# Patient Record
Sex: FEMALE | Race: OTHER | NOT HISPANIC OR LATINO | URBAN - METROPOLITAN AREA
[De-identification: names, ages, dates, MRNs, and addresses within clinical notes are randomized per-mention and may not be internally consistent; named-entity substitution may affect disease eponyms.]

---

## 2017-09-06 ENCOUNTER — OUTPATIENT (OUTPATIENT)
Dept: OUTPATIENT SERVICES | Facility: HOSPITAL | Age: 42
LOS: 1 days | End: 2017-09-06
Payer: COMMERCIAL

## 2017-09-06 DIAGNOSIS — Z00.00 ENCOUNTER FOR GENERAL ADULT MEDICAL EXAMINATION WITHOUT ABNORMAL FINDINGS: ICD-10-CM

## 2017-09-06 PROCEDURE — 71045 X-RAY EXAM CHEST 1 VIEW: CPT

## 2017-09-06 PROCEDURE — 71010: CPT | Mod: 26

## 2017-10-09 ENCOUNTER — RX RENEWAL (OUTPATIENT)
Age: 42
End: 2017-10-09

## 2017-10-09 PROBLEM — Z00.00 ENCOUNTER FOR PREVENTIVE HEALTH EXAMINATION: Status: ACTIVE | Noted: 2017-10-09

## 2017-10-10 ENCOUNTER — APPOINTMENT (OUTPATIENT)
Dept: NEUROLOGY | Facility: CLINIC | Age: 42
End: 2017-10-10

## 2017-12-21 ENCOUNTER — APPOINTMENT (OUTPATIENT)
Dept: ORTHOPEDIC SURGERY | Facility: CLINIC | Age: 42
End: 2017-12-21
Payer: COMMERCIAL

## 2017-12-21 VITALS — HEIGHT: 62 IN | BODY MASS INDEX: 27.05 KG/M2 | WEIGHT: 147 LBS

## 2017-12-21 VITALS — HEART RATE: 88 BPM | DIASTOLIC BLOOD PRESSURE: 71 MMHG | SYSTOLIC BLOOD PRESSURE: 108 MMHG

## 2017-12-21 DIAGNOSIS — M25.511 PAIN IN RIGHT SHOULDER: ICD-10-CM

## 2017-12-21 PROCEDURE — 99203 OFFICE O/P NEW LOW 30 MIN: CPT

## 2017-12-21 PROCEDURE — 73030 X-RAY EXAM OF SHOULDER: CPT | Mod: RT

## 2017-12-22 ENCOUNTER — APPOINTMENT (OUTPATIENT)
Dept: ENDOCRINOLOGY | Facility: CLINIC | Age: 42
End: 2017-12-22
Payer: COMMERCIAL

## 2017-12-22 VITALS
HEART RATE: 90 BPM | DIASTOLIC BLOOD PRESSURE: 70 MMHG | HEIGHT: 62 IN | OXYGEN SATURATION: 98 % | SYSTOLIC BLOOD PRESSURE: 120 MMHG | BODY MASS INDEX: 27.6 KG/M2 | WEIGHT: 150 LBS

## 2017-12-22 LAB
GLUCOSE BLDC GLUCOMTR-MCNC: 256
HBA1C MFR BLD HPLC: 8.6

## 2017-12-22 PROCEDURE — 99244 OFF/OP CNSLTJ NEW/EST MOD 40: CPT | Mod: 25

## 2017-12-22 PROCEDURE — 83036 HEMOGLOBIN GLYCOSYLATED A1C: CPT | Mod: QW

## 2017-12-22 PROCEDURE — 82962 GLUCOSE BLOOD TEST: CPT

## 2017-12-22 RX ORDER — URINE ACETONE TEST STRIPS
STRIP MISCELLANEOUS
Qty: 1 | Refills: 1 | Status: ACTIVE | COMMUNITY
Start: 2017-12-22 | End: 1900-01-01

## 2018-01-12 ENCOUNTER — APPOINTMENT (OUTPATIENT)
Dept: ENDOCRINOLOGY | Facility: CLINIC | Age: 43
End: 2018-01-12
Payer: COMMERCIAL

## 2018-01-12 PROCEDURE — G0108 DIAB MANAGE TRN  PER INDIV: CPT

## 2018-05-01 LAB
ALBUMIN SERPL ELPH-MCNC: 4 G/DL
ALP BLD-CCNC: 73 U/L
ALT SERPL-CCNC: 17 U/L
ANION GAP SERPL CALC-SCNC: 14 MMOL/L
AST SERPL-CCNC: 18 U/L
BILIRUB SERPL-MCNC: 0.2 MG/DL
BUN SERPL-MCNC: 13 MG/DL
CALCIUM SERPL-MCNC: 9.5 MG/DL
CHLORIDE SERPL-SCNC: 101 MMOL/L
CHOLEST SERPL-MCNC: 171 MG/DL
CHOLEST/HDLC SERPL: 2.9 RATIO
CO2 SERPL-SCNC: 23 MMOL/L
CREAT SERPL-MCNC: 0.89 MG/DL
CREAT SPEC-SCNC: 31 MG/DL
GLIADIN IGA SER QL: 6.2 UNITS
GLIADIN IGG SER QL: <5 UNITS
GLIADIN PEPTIDE IGA SER-ACNC: NEGATIVE
GLIADIN PEPTIDE IGG SER-ACNC: NEGATIVE
GLUCOSE SERPL-MCNC: 202 MG/DL
HDLC SERPL-MCNC: 58 MG/DL
LDLC SERPL CALC-MCNC: 71 MG/DL
MICROALBUMIN 24H UR DL<=1MG/L-MCNC: 0.3 MG/DL
MICROALBUMIN/CREAT 24H UR-RTO: 10 MG/G
POTASSIUM SERPL-SCNC: 4.2 MMOL/L
PROT SERPL-MCNC: 6.9 G/DL
SODIUM SERPL-SCNC: 138 MMOL/L
TRIGL SERPL-MCNC: 209 MG/DL
TSH SERPL-ACNC: 1.51 UIU/ML
TTG IGA SER IA-ACNC: 6.4 UNITS
TTG IGA SER-ACNC: NEGATIVE
TTG IGG SER IA-ACNC: <5 UNITS
TTG IGG SER IA-ACNC: NEGATIVE

## 2018-06-12 ENCOUNTER — APPOINTMENT (OUTPATIENT)
Dept: ENDOCRINOLOGY | Facility: CLINIC | Age: 43
End: 2018-06-12
Payer: COMMERCIAL

## 2018-06-12 VITALS
BODY MASS INDEX: 27.05 KG/M2 | OXYGEN SATURATION: 99 % | HEIGHT: 62 IN | SYSTOLIC BLOOD PRESSURE: 110 MMHG | HEART RATE: 8 BPM | WEIGHT: 147 LBS | DIASTOLIC BLOOD PRESSURE: 80 MMHG

## 2018-06-12 LAB
GLUCOSE BLDC GLUCOMTR-MCNC: 200
HBA1C MFR BLD HPLC: 8.1

## 2018-06-12 PROCEDURE — 99214 OFFICE O/P EST MOD 30 MIN: CPT

## 2018-06-12 RX ORDER — ROSUVASTATIN CALCIUM 5 MG/1
5 TABLET, FILM COATED ORAL
Qty: 30 | Refills: 5 | Status: COMPLETED | COMMUNITY
End: 2018-06-12

## 2018-06-12 RX ORDER — BLOOD SUGAR DIAGNOSTIC
STRIP MISCELLANEOUS
Qty: 3 | Refills: 11 | Status: ACTIVE | COMMUNITY
Start: 2018-06-12 | End: 1900-01-01

## 2018-10-05 LAB
CHOLEST SERPL-MCNC: 219 MG/DL
CHOLEST/HDLC SERPL: 3.9 RATIO
HBA1C MFR BLD HPLC: 8.4 %
HDLC SERPL-MCNC: 56 MG/DL
LDLC SERPL CALC-MCNC: 113 MG/DL
TRIGL SERPL-MCNC: 249 MG/DL

## 2018-10-09 ENCOUNTER — APPOINTMENT (OUTPATIENT)
Dept: ENDOCRINOLOGY | Facility: CLINIC | Age: 43
End: 2018-10-09
Payer: COMMERCIAL

## 2018-10-09 VITALS
DIASTOLIC BLOOD PRESSURE: 80 MMHG | BODY MASS INDEX: 26.68 KG/M2 | SYSTOLIC BLOOD PRESSURE: 112 MMHG | OXYGEN SATURATION: 98 % | WEIGHT: 145 LBS | HEART RATE: 72 BPM | HEIGHT: 62 IN

## 2018-10-09 PROCEDURE — 99214 OFFICE O/P EST MOD 30 MIN: CPT

## 2018-10-19 ENCOUNTER — APPOINTMENT (OUTPATIENT)
Dept: ORTHOPEDIC SURGERY | Facility: CLINIC | Age: 43
End: 2018-10-19
Payer: COMMERCIAL

## 2018-10-19 VITALS
SYSTOLIC BLOOD PRESSURE: 107 MMHG | HEIGHT: 62 IN | WEIGHT: 142 LBS | DIASTOLIC BLOOD PRESSURE: 74 MMHG | HEART RATE: 87 BPM | BODY MASS INDEX: 26.13 KG/M2

## 2018-10-19 DIAGNOSIS — M75.01 ADHESIVE CAPSULITIS OF RIGHT SHOULDER: ICD-10-CM

## 2018-10-19 DIAGNOSIS — M67.911 UNSPECIFIED DISORDER OF SYNOVIUM AND TENDON, RIGHT SHOULDER: ICD-10-CM

## 2018-10-19 DIAGNOSIS — M67.912 UNSPECIFIED DISORDER OF SYNOVIUM AND TENDON, LEFT SHOULDER: ICD-10-CM

## 2018-10-19 PROCEDURE — 99213 OFFICE O/P EST LOW 20 MIN: CPT

## 2019-02-01 ENCOUNTER — RESULT CHARGE (OUTPATIENT)
Age: 44
End: 2019-02-01

## 2019-02-01 ENCOUNTER — APPOINTMENT (OUTPATIENT)
Dept: ENDOCRINOLOGY | Facility: CLINIC | Age: 44
End: 2019-02-01

## 2019-03-11 ENCOUNTER — CHART COPY (OUTPATIENT)
Age: 44
End: 2019-03-11

## 2019-03-20 ENCOUNTER — RESULT REVIEW (OUTPATIENT)
Age: 44
End: 2019-03-20

## 2019-03-27 LAB
ALBUMIN SERPL ELPH-MCNC: 4.6 G/DL
ALP BLD-CCNC: 77 U/L
ALT SERPL-CCNC: 15 U/L
ANION GAP SERPL CALC-SCNC: 12 MMOL/L
AST SERPL-CCNC: 19 U/L
BILIRUB SERPL-MCNC: <0.2 MG/DL
BUN SERPL-MCNC: 12 MG/DL
CALCIUM SERPL-MCNC: 9.8 MG/DL
CHLORIDE SERPL-SCNC: 104 MMOL/L
CHOLEST SERPL-MCNC: 240 MG/DL
CHOLEST/HDLC SERPL: 3.8 RATIO
CO2 SERPL-SCNC: 25 MMOL/L
CREAT SERPL-MCNC: 0.84 MG/DL
CREAT SPEC-SCNC: 45 MG/DL
GLUCOSE SERPL-MCNC: 165 MG/DL
HBA1C MFR BLD HPLC: 8.6 %
HDLC SERPL-MCNC: 63 MG/DL
LDLC SERPL CALC-MCNC: 144 MG/DL
MICROALBUMIN 24H UR DL<=1MG/L-MCNC: <1.2 MG/DL
MICROALBUMIN/CREAT 24H UR-RTO: NORMAL MG/G
POTASSIUM SERPL-SCNC: 4.3 MMOL/L
PROT SERPL-MCNC: 7.3 G/DL
SODIUM SERPL-SCNC: 141 MMOL/L
TRIGL SERPL-MCNC: 164 MG/DL
TSH SERPL-ACNC: 1.89 UIU/ML

## 2019-03-29 ENCOUNTER — APPOINTMENT (OUTPATIENT)
Dept: ENDOCRINOLOGY | Facility: CLINIC | Age: 44
End: 2019-03-29
Payer: COMMERCIAL

## 2019-03-29 VITALS
WEIGHT: 147 LBS | SYSTOLIC BLOOD PRESSURE: 110 MMHG | BODY MASS INDEX: 27.05 KG/M2 | DIASTOLIC BLOOD PRESSURE: 80 MMHG | HEIGHT: 62 IN

## 2019-03-29 PROCEDURE — 95251 CONT GLUC MNTR ANALYSIS I&R: CPT

## 2019-03-29 PROCEDURE — 99214 OFFICE O/P EST MOD 30 MIN: CPT

## 2019-03-29 PROCEDURE — 95250 CONT GLUC MNTR PHYS/QHP EQP: CPT

## 2019-03-29 NOTE — ASSESSMENT
[FreeTextEntry1] : 42 year old female with type 1 DM, HLD, here for follow up endocrine visit. \par \par 1)Type 1 DM - moderately uncontrolled, with A1C 8.6% March 2018.  Certainly need more SMBG data to make major changes on insulin pump setting.  For now, will increase afternoon basal from 0.875-0.900 as below\par Start Time: 0:00 Basal: 1 units/hour \par Start Time: 3:00 Basal: 1.15 units/hour \par Start Time: 15:00 Basal: 0.875 --> 0.900 units/hour\par Start Time: 18:00 Basal: 1.00 units/hour       \par Start time: 19:00 Basal: 1.100 units/hour\par Breakfast: 1 unit per 7 grams of carbohydrate. \par Lunch: 1 unit per 4.5 grams of carbohydrate. \par Dinner: 1 unit per 4.5 grams of carbohydrate. \par Correction Insulin: (Blood Glucose Minus Target) divided by sensitivity factor \par BG Target = 100-120\par Insulin Sensitivity Factor = 30 \par Insulin on Board (IOB) Duration = 4 hours \par She will schedule CDE appointment, her insulin pump is out of warranty and she will qualify for a new pump. \par \par \par 2)Hyperlipidemia - LDL was not at goal, she has not been taking crestor conssistently c/w with Crestor 5mg daily\par Will repeat fasting lipid panel in 3 months [Carbohydrate Consistent Diet] : carbohydrate consistent diet [Hypoglycemia Management] : hypoglycemia management [Glucagon Use] : glucagon use [Ketone Testing] : ketone testing [Sick-Day Management] : sick-day management

## 2019-03-29 NOTE — HISTORY OF PRESENT ILLNESS
[FreeTextEntry1] : Ms. KELSEY VASQUEZ is a 43 year old female with history of type 1 DM, diagnosed at the age of 27. \par She has no prior history of diabetic retinopathy, neuropathy or nephropathy.  EGFR 85 ml/min/1.73\par She has no prior history of CAD, CHF or CVA. \par Previous endo at [facility], Dr Charlie Luna MD \par She transferred care here due to change of job.  \par Current diabetes symptoms/problems include: She reports no polydipsia no polyuria and no blurry vision.\par Patient reports adherence to prescribed medical regimen. Patient reports adherence to prescribed physical activity and dietary recommendations.  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 \par The following is a sample of patient’s daily diet/routine, had been more busy with work; transferred here from [facility].  \par Breakfast:boiled egg with coffee, crackers or piece of toast\par 9am - another half cup of coffee\par Lunch: sometimes sandwich, soup\par Dinner: meat, vegetable, and one Carb.  \par She had been just using manual bolus \par Current exercise: walks a lot, lift weights at home \par Weight trend: she gained some weight\par She eats the same thing every day. \par States that recently had been very stressed.  Her daughter is recently pregnant. (Due around Dec 2018) but states she's having some issues with her pregnancy so patient's glucose has been fluctuating secondary to stress.  \par She currently utilize a Minimed Paradigm Revel system.  Uses Humalog.  \par \par Last visit to ophthalmology was: July 2017, no retinopathy.  Needs follow up appointment. \par Last visit to podiatry was: No podiatry visit but no active issue with feet.  \par \par The patient is adherent to diabetic foot precautions:Yes\par Last A1c was: 12/22/2017 8.6% --> 10/09/2018 8.4% --> 03/29/2019 8.6%\par Last LDL was:  mg/dl She was on crestor 5mg daily but has not been taking them consistently.  Last LDL in March 2019 was 144 mg/dl.  \par \par Last urine micro-albumin was:negative March 2019\par \par She check glucose 2-3 times daily. She does not use a CGM. \par \par She has  ketone strips, she has glucagon kit \par \par Regarding hyperlipidemia, on Crestor 5mg daily but not taking consistently.

## 2019-04-12 ENCOUNTER — CLINICAL ADVICE (OUTPATIENT)
Age: 44
End: 2019-04-12

## 2019-04-18 ENCOUNTER — APPOINTMENT (OUTPATIENT)
Dept: ENDOCRINOLOGY | Facility: CLINIC | Age: 44
End: 2019-04-18

## 2019-05-16 ENCOUNTER — MEDICATION RENEWAL (OUTPATIENT)
Age: 44
End: 2019-05-16

## 2019-05-16 RX ORDER — SYRING-NEEDL,DISP,INSUL,0.3 ML 31 GX5/16"
31G X 5/16" SYRINGE, EMPTY DISPOSABLE MISCELLANEOUS
Qty: 1 | Refills: 0 | Status: ACTIVE | COMMUNITY
Start: 2019-05-16

## 2019-06-10 ENCOUNTER — CLINICAL ADVICE (OUTPATIENT)
Age: 44
End: 2019-06-10

## 2019-06-13 ENCOUNTER — APPOINTMENT (OUTPATIENT)
Dept: ENDOCRINOLOGY | Facility: CLINIC | Age: 44
End: 2019-06-13

## 2019-06-20 ENCOUNTER — APPOINTMENT (OUTPATIENT)
Dept: ENDOCRINOLOGY | Facility: CLINIC | Age: 44
End: 2019-06-20
Payer: COMMERCIAL

## 2019-06-20 PROCEDURE — P0006: CPT

## 2019-07-22 ENCOUNTER — APPOINTMENT (OUTPATIENT)
Dept: ENDOCRINOLOGY | Facility: CLINIC | Age: 44
End: 2019-07-22

## 2019-07-30 ENCOUNTER — RX RENEWAL (OUTPATIENT)
Age: 44
End: 2019-07-30

## 2019-10-29 ENCOUNTER — APPOINTMENT (OUTPATIENT)
Dept: ENDOCRINOLOGY | Facility: CLINIC | Age: 44
End: 2019-10-29
Payer: COMMERCIAL

## 2019-10-29 VITALS
DIASTOLIC BLOOD PRESSURE: 80 MMHG | HEART RATE: 80 BPM | SYSTOLIC BLOOD PRESSURE: 112 MMHG | BODY MASS INDEX: 25.76 KG/M2 | HEIGHT: 62 IN | WEIGHT: 140 LBS | OXYGEN SATURATION: 98 %

## 2019-10-29 LAB — GLUCOSE BLDC GLUCOMTR-MCNC: 93

## 2019-10-29 PROCEDURE — 99214 OFFICE O/P EST MOD 30 MIN: CPT

## 2019-10-29 NOTE — ASSESSMENT
[FreeTextEntry1] : Patient is a 44-year-old female with history of type 1 diabetes mellitus, here for endocrinology followup, currently utilizing DEXCOM G6 sensor as well as Tandem insulin pump.\par \par #1 type 1 diabetes mellitus\par A1c today is 6.9%.\par Patient notices hypoglycemia in the setting of corrections which suggests that her insulin sensitivity factor should be changed.  We will change insulin sensitivity factor from 1-30 to 1-60\par We will change the glucose doctor from 100 to 120 mg/dL.\par We will continue with the basal insulin as above.\par \par #2 Hyperlipidemia\par LDL not at goal of less than 70 mg/dL.  She has not been taking Crestor consistently.  Reporting some shoulder pain which I do not think it secondary to myalgias.  She will be starting physical therapy.  Recommend to restart Crestor 5 mg every other day.  We will repeat her blood work in 3 months.

## 2019-10-29 NOTE — THERAPY
[_____] :  [unfilled] units/hour [BG Target = ____] : BG Target = [unfilled] [Insulin Sensitivity Factor = ____] : Insulin Sensitivity Factor = [unfilled] [Insulin on Board (IOB) Duration = ____ hours] : Insulin on Board (IOB) Duration  = [unfilled] hours

## 2019-10-29 NOTE — HISTORY OF PRESENT ILLNESS
[FreeTextEntry1] : Angy is a 44-year-old female with history type 1 diabetes mellitus, diagnosed at age 27.  She had no prior microvascular complication including diabetic retinopathy, neuropathy nephropathy.  Her EGFR was 85 checked in March 2019.  She had no prior history of coronary artery disease, CHF with CVA as well.\par \par She is to follow with endocrinologist Dr. Luna at [facility] and now transferred care here when she started working for [employer].  \par \par She is currently using tandem insulin pump along with dexcom g6 sensor.  She really likes the Smart [place]  system which helped to prevent hypoglycemia.. \par \par She is still living in the [place] a long commute from home to work.  Looking into moving to [place].\par \par \par 9am - another half cup of coffee\par Lunch: sometimes sandwich, soup\par Dinner: meat, vegetable, and one Carb.  \par She had been just using manual bolus \par Current exercise: walks a lot, lift weights at home \par Weight trend: she gained some weight\par She eats the same thing every day. \par States that recently had been very stressed.  Her daughter is recently pregnant. (Due around Dec 2018) but states she's having some issues with her pregnancy so patient's glucose has been fluctuating secondary to stress.  \par She currently utilize a Minimed Paradigm Revel system.  Uses Humalog.  \par \par Last visit to ophthalmology was: July 2017, no retinopathy.  Needs follow up appointment. \par Last visit to podiatry was: No podiatry visit but no active issue with feet.  \par \par The patient is adherent to diabetic foot precautions:Yes\par Last A1c was: 12/22/2017 8.6% --> 10/09/2018 8.4% --> 03/29/2019 8.6%\par Last LDL was:  mg/dl She was on crestor 5mg daily but has not been taking them consistently.  Last LDL in March 2019 was 144 mg/dl.  \par \par Last urine micro-albumin was:negative March 2019\par \par She check glucose 2-3 times daily. She does not use a CGM. \par \par She has  ketone strips, she has glucagon kit \par \par Regarding hyperlipidemia, on Crestor 5mg daily but not taking consistently.

## 2019-10-31 LAB — HBA1C MFR BLD HPLC: 6.1

## 2019-11-11 ENCOUNTER — APPOINTMENT (OUTPATIENT)
Dept: ORTHOPEDIC SURGERY | Facility: CLINIC | Age: 44
End: 2019-11-11
Payer: COMMERCIAL

## 2019-11-11 VITALS
DIASTOLIC BLOOD PRESSURE: 75 MMHG | HEART RATE: 81 BPM | SYSTOLIC BLOOD PRESSURE: 122 MMHG | BODY MASS INDEX: 25.03 KG/M2 | HEIGHT: 62 IN | WEIGHT: 136 LBS

## 2019-11-11 PROCEDURE — 73030 X-RAY EXAM OF SHOULDER: CPT | Mod: LT

## 2019-11-11 PROCEDURE — 99214 OFFICE O/P EST MOD 30 MIN: CPT

## 2020-01-02 NOTE — PHYSICAL EXAM
[de-identified] : General: well-appearing, not in acute distress and not obese. \par Gait: normal. \par Oriented to time, place, person\par Mood: Normal\par Affect: Normal\par \par Left shoulder exam\par \par Inspection: No malalignment, No defects, No atrophy\par Skin: No masses, No lesions\par Neck: Negative Spurling's, full ROM, no pain with ROM\par AROM: FF to 160, abduction to 90, ER to 0, IR to mid lumbar\par Painful arc ROM: none\par Tenderness: + bicipital tenderness, no tenderness to the greater tuberosity/RTC insertion, + anterior shoulder/lesser tuberosity tenderness\par Strength: 5/5 ER, 4/5 IR in adduction with pain, 5/5 supraspinatus testing, + [place]'s test\par AC Joint: No ttp/pain with cross arm testing\par Biceps: Speed Negative, [place] Negative\par Impingement test: + Contreras, + Neer \par Stability: Stable\par Vasc: 2+ radial pulse\par Neuro: AIN, PIN, Ulnar nerve intact to motor\par Sensation: Intact to light touch throughout [de-identified] : The following radiographs were ordered and read by me during this patients visit. I reviewed each radiograph in detail with the patient and discussed the findings as highlighted below. \par \par 3 views of the left shoulder were obtained today 11/11/2019  that show no acute fracture or dislocation. There is no glenohumeral and no AC joint degenerative change seen. Type I acromion. There is no significant malalignment. No significant other obvious osseous abnormality, otherwise unremarkable\par

## 2020-01-02 NOTE — HISTORY OF PRESENT ILLNESS
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 No injury reported. The pain is constant worse at night. Localizes pain to her anterior aspect of the shoulder with radiation to her neck and left bicep. Denies numbness or tingling. She uses Aleve/Motrin occasionally which helps. Previously seen by Rosie who diagnosed her with adhesive capsulitis on the right shoulder in 2018. \par \par The patient's past medical history, past surgical history, medications and allergies were reviewed by me today with the patient and documented accordingly. In addition, the patient's family and social history, which were noncontributory to this visit, were reviewed also.

## 2020-01-02 NOTE — DISCUSSION/SUMMARY
[de-identified] : 44 year old female presents with left shoulder pain.\par \par A discussion was had with the patient regarding potential sources of inflammatory shoulder discomfort; including rotator cuff tendon dysfunction (including tendonitis vs. internal structural damage), subdeltoid/subacromial bursitis, or impingement of the rotator cuff at the acromion. Other contributing sources of pain may be the acromioclavicular joint or long head of the biceps tendon. Irritation is typically caused either by overhead repetitive activity or as a result of minor injury.\par  \par Discussed short-term and long-term outcomes as well as the goal of treatment to reduce pain and restore function. Nonsurgical treatment is typically first-line therapy that may take weeks to months to resolve symptoms; includes rest from overhead activities, NSAIDs, home exercise program versus physical therapy to restore normal strength/ROM/function of the shoulder, and possible corticosteroid injection. Also discussed the role of arthroscopic surgical intervention when nonsurgical treatment does not adequately relieve pain/inflammation.\par  \par Recommendations: Conservative modalities as above (overhead activity rest/activity avoidance until less symptomatic, ice, NSAIDs, home exercise strengthening and stretching program).\par \par Rx for Naproxen given. HEP given.\par  \par Followup: If symptoms progress or persist for additional treatment as needed

## 2020-02-06 LAB
ALBUMIN SERPL ELPH-MCNC: 4.3 G/DL
ALP BLD-CCNC: 57 U/L
ALT SERPL-CCNC: 14 U/L
ANION GAP SERPL CALC-SCNC: 13 MMOL/L
AST SERPL-CCNC: 19 U/L
BILIRUB SERPL-MCNC: 0.3 MG/DL
BUN SERPL-MCNC: 12 MG/DL
CALCIUM SERPL-MCNC: 9.6 MG/DL
CHLORIDE SERPL-SCNC: 101 MMOL/L
CHOLEST SERPL-MCNC: 207 MG/DL
CHOLEST/HDLC SERPL: 3.2 RATIO
CO2 SERPL-SCNC: 25 MMOL/L
CREAT SERPL-MCNC: 0.93 MG/DL
CREAT SPEC-SCNC: 46 MG/DL
ESTIMATED AVERAGE GLUCOSE: 151 MG/DL
GLUCOSE SERPL-MCNC: 219 MG/DL
HBA1C MFR BLD HPLC: 6.9 %
HDLC SERPL-MCNC: 64 MG/DL
LDLC SERPL CALC-MCNC: 109 MG/DL
MICROALBUMIN 24H UR DL<=1MG/L-MCNC: <1.2 MG/DL
MICROALBUMIN/CREAT 24H UR-RTO: NORMAL MG/G
POTASSIUM SERPL-SCNC: 4.2 MMOL/L
PROT SERPL-MCNC: 6.4 G/DL
SODIUM SERPL-SCNC: 139 MMOL/L
TRIGL SERPL-MCNC: 169 MG/DL
TSH SERPL-ACNC: 1.44 UIU/ML

## 2020-04-03 ENCOUNTER — APPOINTMENT (OUTPATIENT)
Dept: ENDOCRINOLOGY | Facility: CLINIC | Age: 45
End: 2020-04-03

## 2020-04-26 ENCOUNTER — MESSAGE (OUTPATIENT)
Age: 45
End: 2020-04-26

## 2020-05-20 ENCOUNTER — TRANSCRIPTION ENCOUNTER (OUTPATIENT)
Age: 45
End: 2020-05-20

## 2020-05-20 ENCOUNTER — APPOINTMENT (OUTPATIENT)
Dept: ORTHOPEDIC SURGERY | Facility: CLINIC | Age: 45
End: 2020-05-20
Payer: COMMERCIAL

## 2020-05-20 DIAGNOSIS — M67.912 UNSPECIFIED DISORDER OF SYNOVIUM AND TENDON, LEFT SHOULDER: ICD-10-CM

## 2020-05-20 PROCEDURE — 99213 OFFICE O/P EST LOW 20 MIN: CPT | Mod: 95

## 2020-05-21 PROBLEM — M67.912 TENDINOPATHY OF LEFT ROTATOR CUFF: Status: ACTIVE | Noted: 2018-10-19

## 2020-05-21 NOTE — ADDENDUM
[FreeTextEntry1] : This note was written by Melinda Dennison on 11/11/2019 acting solely as a scribe for Dr. Alexandr Bobby.\par \par All medical record entries made by the Scribe were at my, Dr. Alexandr Bobby, direction and personally dictated by me on 11/11/2019. I have personally reviewed the chart and agree that the record accurately reflects my personal performance of the history, physical exam, assessment and plan.\par

## 2020-05-21 NOTE — HISTORY OF PRESENT ILLNESS
[Home] : at home, [unfilled] , at the time of the visit. [Verbal consent obtained from patient] : the patient, [unfilled] [Medical Office: ([facility])___] : at the medical office located in  [FreeTextEntry4] : Gloria Gallagher [de-identified] : 44 year old RHD diabetic female presents for follow up left shoulder pain. Reports that her pain has been getting progressively worse. HEP given at last visit was not helpful. Advil is not helpful.  No injury reported. The pain is constant worse at night. Localizes pain to her anterior and posterior aspect of the shoulder. Denies numbness or tingling. Previously seen by Rosie who diagnosed her with adhesive capsulitis on the right shoulder in 2018.

## 2020-05-21 NOTE — DISCUSSION/SUMMARY
[de-identified] : 44 year old female presents with left shoulder pain.\par \par Patient has continued loss of motion the left shoulder which is consistent with early adhesive capsulitis.  Patient continues to have worsening of symptoms including loss of motion and function.  Pain is not well tolerated, and was unable to tolerate naproxen.  Discussed use of Celebrex instead.  Concerning prognosis given her comorbidity of diabetes.  She may have some underlying rotator cuff insufficiency.  Recommendations would likely include formal physical therapy for passive stretching, as well as anti-inflammatory control as needed.\par \par Given persistent symptoms.  Would recommend MRI imaging to confirm any internal derangement in addition to early capsular adhesions.\par  \par Followup: After MRI

## 2020-05-21 NOTE — PHYSICAL EXAM
[de-identified] : General: well-appearing, not in acute distress and not obese. \par Gait: normal. \par Oriented to time, place, person\par Mood: Normal\par Affect: Normal\par \par Left shoulder exam\par \par Inspection: No malalignment, No defects, No atrophy\par Skin: No masses, No lesions\par Neck: Negative Spurling's, full ROM, no pain with ROM\par AROM: FF to 160, abduction to 70, ER to 20, IR to mid lumbar\par Painful arc ROM: Pain with ROM\par Tenderness: + bicipital tenderness, no tenderness to the greater tuberosity/RTC insertion, + anterior shoulder/lesser tuberosity tenderness\par Strength: Unable to test\par AC Joint: No ttp/pain with cross arm testing\par Impingement test: + Con[place]ras, + Neer \par Stability: Stable\par Vasc: Pink perfused\par Neuro: AIN, PIN, Ulnar nerve intact to motor\par Sensation: Intact to light touch throughout [de-identified] : 3 views of the left shoulder were obtained 11/11/2019  that show no acute fracture or dislocation. There is no glenohumeral and no AC joint degenerative change seen. Type I acromion. There is no significant malalignment. No significant other obvious osseous abnormality, otherwise unremarkable\par

## 2020-07-10 ENCOUNTER — APPOINTMENT (OUTPATIENT)
Dept: ORTHOPEDIC SURGERY | Facility: CLINIC | Age: 45
End: 2020-07-10
Payer: COMMERCIAL

## 2020-07-10 DIAGNOSIS — M75.02 ADHESIVE CAPSULITIS OF LEFT SHOULDER: ICD-10-CM

## 2020-07-10 PROCEDURE — 99214 OFFICE O/P EST MOD 30 MIN: CPT | Mod: 95

## 2020-08-04 PROBLEM — M75.02 ADHESIVE CAPSULITIS OF LEFT SHOULDER: Status: ACTIVE | Noted: 2020-08-04

## 2020-08-04 NOTE — PHYSICAL EXAM
[de-identified] : General: well-appearing, not in acute distress and not obese. \par Gait: normal. \par Oriented to time, place, person\par Mood: Normal\par Affect: Normal\par \par Left shoulder exam\par \par Inspection: No malalignment, No defects, No atrophy\par Skin: No masses, No lesions\par Neck: Negative Spurling's, full ROM, no pain with ROM\par AROM: FF to 160, abduction to 70, ER to 20, IR to mid lumbar\par Painful arc ROM: Pain with ROM\par Tenderness: + bicipital tenderness, no tenderness to the greater tuberosity/RTC insertion, + anterior shoulder/lesser tuberosity tenderness\par Strength: Unable to test\par AC Joint: No ttp/pain with cross arm testing\par Impingement test: + Contreras, + Neer \par Stability: Stable\par Vasc: Pink perfused\par Neuro: AIN, PIN, Ulnar nerve intact to motor\par Sensation: Intact to light touch throughout [de-identified] : 3 views of the left shoulder were obtained 11/11/2019  that show no acute fracture or dislocation. There is no glenohumeral and no AC joint degenerative change seen. Type I acromion. There is no significant malalignment. No significant other obvious osseous abnormality, otherwise unremarkable\par \par MRI left shoulder dated 7.4.20 shows degenerative tear of the superior labrum.  Capsular thickening consistent with adhesive capsulitis.

## 2020-08-04 NOTE — DISCUSSION/SUMMARY
[de-identified] : 44 year old female presents with left shoulder pain.\par \par Patient has continued loss of motion the left shoulder which is consistent with early adhesive capsulitis.  MRI is also suggestive of an adhesive capsulitis with local edema within the rotator cuff interval and superior labrum.  There is also capsular thickening consistent with capsular constriction.  \par \par Recommendation: PT for capsular stretching, strengthening as tolerated.  Continue NSAIDs/ice PRN\par  \par Followup 2-3mos

## 2020-08-04 NOTE — HISTORY OF PRESENT ILLNESS
[Medical Office: ([facility])___] : at the medical office located in  [Home] : at home, [unfilled] , at the time of the visit. [Verbal consent obtained from patient] : the patient, [unfilled] [de-identified] : 44 year old RHD diabetic female presents for follow up left shoulder pain. Reports that her pain has been getting progressively worse. HEP given was not helpful. Advil is not helpful.  No injury reported. The pain is constant worse at night. Localizes pain to her anterior and posterior aspect of the shoulder. Denies numbness or tingling. Previously seen by Rosie who diagnosed her with adhesive capsulitis on the right shoulder in 2018. She was referred for an MRI of the left shoulder and would like to discuss the results  [FreeTextEntry4] : Augustus Mckeon

## 2020-09-18 ENCOUNTER — RX RENEWAL (OUTPATIENT)
Age: 45
End: 2020-09-18

## 2020-09-21 ENCOUNTER — RX RENEWAL (OUTPATIENT)
Age: 45
End: 2020-09-21

## 2020-09-25 ENCOUNTER — APPOINTMENT (OUTPATIENT)
Dept: ENDOCRINOLOGY | Facility: CLINIC | Age: 45
End: 2020-09-25
Payer: COMMERCIAL

## 2020-09-25 VITALS
SYSTOLIC BLOOD PRESSURE: 120 MMHG | TEMPERATURE: 98 F | DIASTOLIC BLOOD PRESSURE: 80 MMHG | HEART RATE: 59 BPM | HEIGHT: 62 IN | OXYGEN SATURATION: 98 %

## 2020-09-25 PROCEDURE — 99214 OFFICE O/P EST MOD 30 MIN: CPT

## 2020-09-25 NOTE — ASSESSMENT
[FreeTextEntry1] : Patient is a 44-year-old female with history of type 1 diabetes mellitus, here for endocrinology followup, currently utilizing DEXCOM G6 sensor as well as Tandem insulin pump.\par \par #1 type 1 diabetes mellitus\par Patient's average glucose is 215, lowest was 20, highest 356 mg/dL.\par Patient is using basal IQ technology 99% of the time.\par Notable to have suspensions between 10 PM to 6 AM.\par Patient is average total daily dose was 47.09 units/day\par Her current insulin pump setting is\par Midnight 0.850\par 3 AM 1.00\par 7 AM 1.150\par 11:30 AM 1.150\par 3 PM 0.9 units/h\par 6 PM 1 units/h\par 7 PM 1.1 units/h\par Total 24-hour 24.95\par \par Correction factor I unit: 60 mg/dL\par Carb ratio\par Midnight 1: 7 g\par 11:30 AM 1: 4.5 g\par Target glucose 120 mg/dL\par Active insulin time 4 hours\par Maximum bolus 15 units\par \par Patient given information to upgrade to control IQ system with tandem. Contact office if any upgrade issue, prescription sent. \par \par #2 Hyperlipidemia\par Continue with statin thearpy.  [Diabetes Foot Care] : diabetes foot care [Long Term Vascular Complications] : long term vascular complications of diabetes [Carbohydrate Consistent Diet] : carbohydrate consistent diet [Importance of Diet and Exercise] : importance of diet and exercise to improve glycemic control, achieve weight loss and improve cardiovascular health [Exercise/Effect on Glucose] : exercise/effect on glucose [Hypoglycemia Management] : hypoglycemia management [Glucagon Use] : glucagon use [Ketone Testing] : ketone testing [Action and use of Insulin] : action and use of short and long-acting insulin [Self Monitoring of Blood Glucose] : self monitoring of blood glucose [Insulin Self-Administration] : insulin self-administration [Injection Technique, Storage, Sharps Disposal] : injection technique, storage, and sharps disposal [Sick-Day Management] : sick-day management [Retinopathy Screening] : Patient was referred to ophthalmology for retinopathy screening

## 2020-09-25 NOTE — HISTORY OF PRESENT ILLNESS
[FreeTextEntry1] : Angy is a 45-year-old female with history type 1 diabetes mellitus, diagnosed at age 27.  She had no prior microvascular complication including diabetic retinopathy, neuropathy nephropathy.  Her EGFR was 75 in Feb 2020.\par  \par She had no prior history of coronary artery disease, CHF with CVA as well.\par \par She was following up with endocrinologist Dr. Luna at [facility] and now transferred care here when she started working for [employer].  \par \par She is currently using tandem insulin pump along with dexcom g6 sensor.  She really likes the Smart [place] system which helped to prevent hypoglycemia.. \par \par She moved to [place] about 3 months ago, really loves the area.  Recently have a new baby grandson.  Also has grand-daughter about 2 years old.  \par \par She was doing ketogenic diet from March 2020 to June 2020.  States that she lost about 30 lbs.  But when she went back to eating Carbs, she noticed big spikes in her glucose.  She is reintroducing carbohydrate her diet.  Per download, eating about 30 to 45 g of carbohydrate with each meals.\par \par Current exercise: walks a lot, lift weights at home \par Weight trend: she gained some weight\par She eats the same thing every day. \par \par Last visit to ophthalmology was: July 2017, no retinopathy.  Needs follow up appointment. \par Last visit to podiatry was: No podiatry visit but no active issue with feet.  \par \par The patient is adherent to diabetic foot precautions:Yes\par Last A1c was: 12/22/2017 8.6% --> 10/09/2018 8.4% --> 03/29/2019 8.6%\par Last LDL was:  mg/dl She was on crestor 5mg daily but has not been taking them consistently.  Last LDL in March 2019 was 144 mg/dl.  \par \par Last urine micro-albumin was:negative March 2019\par \par She has  ketone strips, she has glucagon kit \par \par Regarding hyperlipidemia, on Crestor 5mg daily.

## 2020-10-13 ENCOUNTER — APPOINTMENT (OUTPATIENT)
Dept: OBGYN | Facility: CLINIC | Age: 45
End: 2020-10-13
Payer: COMMERCIAL

## 2020-10-13 VITALS
BODY MASS INDEX: 24.66 KG/M2 | SYSTOLIC BLOOD PRESSURE: 120 MMHG | WEIGHT: 134 LBS | DIASTOLIC BLOOD PRESSURE: 60 MMHG | HEIGHT: 62 IN

## 2020-10-13 DIAGNOSIS — Z01.419 ENCOUNTER FOR GYNECOLOGICAL EXAMINATION (GENERAL) (ROUTINE) W/OUT ABNORMAL FINDINGS: ICD-10-CM

## 2020-10-13 PROCEDURE — 99386 PREV VISIT NEW AGE 40-64: CPT

## 2020-10-13 NOTE — PHYSICAL EXAM
[Appropriately responsive] : appropriately responsive [Alert] : alert [No Acute Distress] : no acute distress [No Lymphadenopathy] : no lymphadenopathy [No Murmurs] : no murmurs [Soft] : soft [Non-tender] : non-tender [Non-distended] : non-distended [No HSM] : No HSM [No Lesions] : no lesions [No Mass] : no mass [Oriented x3] : oriented x3 [Examination Of The Breasts] : a normal appearance [No Masses] : no breast masses were palpable [Labia Majora] : normal [Labia Minora] : normal [Normal] : normal [Uterine Adnexae] : normal [IUD String] : an IUD string was noted

## 2020-10-14 LAB
C TRACH RRNA SPEC QL NAA+PROBE: NOT DETECTED
HPV HIGH+LOW RISK DNA PNL CVX: NOT DETECTED
N GONORRHOEA RRNA SPEC QL NAA+PROBE: NOT DETECTED
SOURCE AMPLIFICATION: NORMAL

## 2020-10-19 LAB — CYTOLOGY CVX/VAG DOC THIN PREP: ABNORMAL

## 2020-12-23 PROBLEM — Z01.419 ENCOUNTER FOR WELL WOMAN EXAM WITH ROUTINE GYNECOLOGICAL EXAM: Status: RESOLVED | Noted: 2020-10-13 | Resolved: 2020-12-23

## 2021-01-06 ENCOUNTER — NON-APPOINTMENT (OUTPATIENT)
Age: 46
End: 2021-01-06

## 2021-01-11 ENCOUNTER — NON-APPOINTMENT (OUTPATIENT)
Age: 46
End: 2021-01-11

## 2021-07-15 ENCOUNTER — NON-APPOINTMENT (OUTPATIENT)
Age: 46
End: 2021-07-15

## 2021-08-02 ENCOUNTER — NON-APPOINTMENT (OUTPATIENT)
Age: 46
End: 2021-08-02

## 2021-08-02 ENCOUNTER — APPOINTMENT (OUTPATIENT)
Dept: OPHTHALMOLOGY | Facility: CLINIC | Age: 46
End: 2021-08-02
Payer: COMMERCIAL

## 2021-08-02 PROCEDURE — 92004 COMPRE OPH EXAM NEW PT 1/>: CPT

## 2021-08-02 PROCEDURE — 92134 CPTRZ OPH DX IMG PST SGM RTA: CPT

## 2021-08-06 LAB
ALBUMIN SERPL ELPH-MCNC: 4.7 G/DL
ALP BLD-CCNC: 67 U/L
ALT SERPL-CCNC: 16 U/L
ANION GAP SERPL CALC-SCNC: 12 MMOL/L
AST SERPL-CCNC: 20 U/L
BILIRUB SERPL-MCNC: 0.2 MG/DL
BUN SERPL-MCNC: 14 MG/DL
CALCIUM SERPL-MCNC: 9.8 MG/DL
CHLORIDE SERPL-SCNC: 104 MMOL/L
CHOLEST SERPL-MCNC: 207 MG/DL
CO2 SERPL-SCNC: 24 MMOL/L
CREAT SERPL-MCNC: 0.86 MG/DL
CREAT SPEC-SCNC: 49 MG/DL
ESTIMATED AVERAGE GLUCOSE: 148 MG/DL
GLUCOSE SERPL-MCNC: 103 MG/DL
HBA1C MFR BLD HPLC: 6.8 %
HCG SERPL-MCNC: <1 MIU/ML
HDLC SERPL-MCNC: 74 MG/DL
LDLC SERPL CALC-MCNC: 110 MG/DL
MICROALBUMIN 24H UR DL<=1MG/L-MCNC: <1.2 MG/DL
MICROALBUMIN/CREAT 24H UR-RTO: NORMAL MG/G
NONHDLC SERPL-MCNC: 132 MG/DL
POTASSIUM SERPL-SCNC: 4.1 MMOL/L
PROT SERPL-MCNC: 7 G/DL
SODIUM SERPL-SCNC: 139 MMOL/L
TRIGL SERPL-MCNC: 114 MG/DL
TSH SERPL-ACNC: 1.56 UIU/ML

## 2021-08-10 ENCOUNTER — APPOINTMENT (OUTPATIENT)
Dept: OBGYN | Facility: CLINIC | Age: 46
End: 2021-08-10
Payer: COMMERCIAL

## 2021-08-10 VITALS
HEIGHT: 62 IN | DIASTOLIC BLOOD PRESSURE: 79 MMHG | WEIGHT: 137.38 LBS | BODY MASS INDEX: 25.28 KG/M2 | SYSTOLIC BLOOD PRESSURE: 115 MMHG

## 2021-08-10 DIAGNOSIS — Z30.431 ENCOUNTER FOR ROUTINE CHECKING OF INTRAUTERINE CONTRACEPTIVE DEVICE: ICD-10-CM

## 2021-08-10 DIAGNOSIS — Z30.430 ENCOUNTER FOR INSERTION OF INTRAUTERINE CONTRACEPTIVE DEVICE: ICD-10-CM

## 2021-08-10 PROCEDURE — 58300 INSERT INTRAUTERINE DEVICE: CPT | Mod: 59

## 2021-08-10 PROCEDURE — 58301 REMOVE INTRAUTERINE DEVICE: CPT

## 2021-08-10 PROCEDURE — 76830 TRANSVAGINAL US NON-OB: CPT | Mod: 26

## 2021-08-10 NOTE — PROCEDURE
[Locate IUD] : locate IUD [IUD Placement] : intrauterine device (IUD) placement [Prevention of Pregnancy] : prevention of pregnancy [Neg Pregnancy Test] : negative pregnancy test [History of Unprotected [place]] : no history of unprotected intercourse [No Premedication] : No premedication [Tenaculum] : Tenaculum [Easy Passage] : Easy passage [Sounded to ___ cm] : sounded to [unfilled] ~Ucm [Mirena IUD] : Mirena IUD [None] : None [IUD Removal] : intrauterine device (IUD) removal [Time out performed] : Pre-procedure time out performed.  Patient's name, date of birth and procedure confirmed. [Consent Obtained] : Consent obtained [ IUD] :  IUD [Risks] : risks [Benefits] : benefits [Alternatives] : alternatives [Patient] : patient [Speculum Placed] : speculum placed [IUD Removed - Forceps] : IUD removed - forceps [IUD Discarded] : IUD discarded [Tolerated Well] : Patient tolerated the procedure well [No Complications] : no complications [___ Week(s)] : in [unfilled] week(s) [FreeTextEntry3] : IUD at fundus [de-identified] : [id number] [de-identified] : 10/2023 [de-identified] : 8/2026 - 8/2028

## 2021-08-11 LAB
C TRACH RRNA SPEC QL NAA+PROBE: NOT DETECTED
N GONORRHOEA RRNA SPEC QL NAA+PROBE: NOT DETECTED
SOURCE AMPLIFICATION: NORMAL

## 2021-09-08 ENCOUNTER — APPOINTMENT (OUTPATIENT)
Dept: OPHTHALMOLOGY | Facility: CLINIC | Age: 46
End: 2021-09-08

## 2021-09-08 ENCOUNTER — APPOINTMENT (OUTPATIENT)
Dept: OPHTHALMOLOGY | Facility: CLINIC | Age: 46
End: 2021-09-08
Payer: COMMERCIAL

## 2021-09-08 ENCOUNTER — NON-APPOINTMENT (OUTPATIENT)
Age: 46
End: 2021-09-08

## 2021-09-08 PROCEDURE — 76514 ECHO EXAM OF EYE THICKNESS: CPT

## 2021-09-08 PROCEDURE — 92133 CPTRZD OPH DX IMG PST SGM ON: CPT

## 2021-09-08 PROCEDURE — 92012 INTRM OPH EXAM EST PATIENT: CPT

## 2021-09-08 PROCEDURE — 92083 EXTENDED VISUAL FIELD XM: CPT

## 2021-09-17 ENCOUNTER — APPOINTMENT (OUTPATIENT)
Dept: ENDOCRINOLOGY | Facility: CLINIC | Age: 46
End: 2021-09-17
Payer: COMMERCIAL

## 2021-09-17 VITALS
RESPIRATION RATE: 16 BRPM | SYSTOLIC BLOOD PRESSURE: 104 MMHG | WEIGHT: 140 LBS | DIASTOLIC BLOOD PRESSURE: 72 MMHG | TEMPERATURE: 98 F | HEART RATE: 70 BPM | BODY MASS INDEX: 25.76 KG/M2 | OXYGEN SATURATION: 98 % | HEIGHT: 62 IN

## 2021-09-17 PROCEDURE — 99214 OFFICE O/P EST MOD 30 MIN: CPT

## 2021-09-17 RX ORDER — INSULIN GLARGINE 100 [IU]/ML
100 INJECTION, SOLUTION SUBCUTANEOUS
Qty: 1 | Refills: 0 | Status: DISCONTINUED | COMMUNITY
Start: 2019-05-16 | End: 2021-09-17

## 2021-09-17 RX ORDER — INSULIN GLARGINE 100 [IU]/ML
100 INJECTION, SOLUTION SUBCUTANEOUS
Qty: 1 | Refills: 0 | Status: ACTIVE | COMMUNITY
Start: 2021-09-17 | End: 1900-01-01

## 2021-09-17 NOTE — PHYSICAL EXAM
[Alert] : alert [Well Nourished] : well nourished [No Acute Distress] : no acute distress [Normal Sclera/Conjunctiva] : normal sclera/conjunctiva [Well Developed] : well developed [EOMI] : extra ocular movement intact [No Proptosis] : no proptosis [Normal Oropharynx] : the oropharynx was normal [Thyroid Not Enlarged] : the thyroid was not enlarged [No Thyroid Nodules] : no palpable thyroid nodules [No Respiratory Distress] : no respiratory distress [No Accessory Muscle Use] : no accessory muscle use [Clear to Auscultation] : lungs were clear to auscultation bilaterally [Normal S1, S2] : normal S1 and S2 [Normal Rate] : heart rate was normal [Regular Rhythm] : with a regular rhythm [No Edema] : no peripheral edema [Pedal Pulses Normal] : the pedal pulses are present [Normal Bowel Sounds] : normal bowel sounds [Not Tender] : non-tender [Not Distended] : not distended [Soft] : abdomen soft [Normal Anterior Cervical Nodes] : no anterior cervical lymphadenopathy [No Spinal Tenderness] : no spinal tenderness [Spine Straight] : spine straight [No Stigmata of Cushings Syndrome] : no stigmata of Cushings Syndrome [Normal Gait] : normal gait [Normal Strength/Tone] : muscle strength and tone were normal [No Rash] : no rash [Acanthosis Nigricans] : no acanthosis nigricans [No Tremors] : no tremors [Normal Reflexes] : deep tendon reflexes were 2+ and symmetric [Oriented x3] : oriented to person, place, and time

## 2021-09-17 NOTE — HISTORY OF PRESENT ILLNESS
[FreeTextEntry1] : Angy is a 46-year-old female with history type 1 diabetes mellitus, diagnosed at age 27.  She had no prior microvascular complication including diabetic retinopathy, neuropathy nephropathy.  Her EGFR was 75 in Feb 2020.\par  \par She had no prior history of coronary artery disease, CHF with CVA as well.\par \par She was following up with endocrinologist Dr. Luna at [facility] and now transferred care here when she started working for [employer].  \par \par She is currently using tandem insulin pump along with dexcom g6 sensor.  She really likes the Smart Speedment system which helped to prevent hypoglycemia.. \par \par She moved to [place] about 3 months ago, really loves the area.  Recently have a new baby grandson.  Also has grand-daughter about 2 years old.  \par \par She was doing ketogenic diet from March 2020 to June 2020.  States that she lost about 30 lbs.  But when she went back to eating Carbs, she noticed big spikes in her glucose.  She is reintroducing carbohydrate her diet.  Per download, eating about 30 to 45 g of carbohydrate with each meals.\par \par Current exercise: walks a lot, lift weights at home \par Weight trend: she gained some weight\par She eats the same thing every day. \par \par The patient is adherent to diabetic foot precautions:Yes\par Last A1c was: 12/22/2017 8.6% --> 10/09/2018 8.4% --> 03/29/2019 8.6%\par Last LDL was:  mg/dl She was on crestor 5mg daily but has not been taking them consistently.  Last LDL in March 2019 was 144 mg/dl.  \par \par Last urine micro-albumin was:negative March 2019\par \par She has  ketone strips, she has glucagon kit \par \par Regarding hyperlipidemia, on Crestor 5mg daily.

## 2021-09-17 NOTE — ASSESSMENT
[FreeTextEntry1] : Patient is a 44-year-old female with history of type 1 diabetes mellitus, here for endocrinology followup, currently utilizing DEXCOM G6 sensor as well as Tandem insulin pump.\par \par 1. Type 1 DM\par Patient's average glucose is 215, lowest was 20, highest 356 mg/dL.\par Patient is using basal IQ technology 99% of the time.\par Notable to have suspensions between 10 PM to 6 AM.\par Patient is average total daily dose was 47.09 units/day\par Her current insulin pump setting is\par Midnight 0.850\par 3 AM 1.00\par 7 AM 1.150\par 11:30 AM 1.150\par 3 PM 0.9 units/h\par 6 PM 1 units/h\par 7 PM 1.1 units/h\par Total 24-hour 24.95\par \par Correction factor I unit: 60 mg/dL\par Carb ratio\par Midnight 1: 7 g\par 11:30 AM 1: 4.5 g\par Target glucose 120 mg/dL\par Active insulin time 4 hours\par Maximum bolus 15 units\par \par Tried to upgrade to Control IQ, but she was having issues at the last step\par I'll reach out to RN Charissa Brooks to assist patient via telehealth or in person visit. \par UTD with eye doctor and podiatrist. \par \par 2. Hyperlipidemia\par Continue with statin therapy.\par  [Diabetes Foot Care] : diabetes foot care [Long Term Vascular Complications] : long term vascular complications of diabetes [Carbohydrate Consistent Diet] : carbohydrate consistent diet [Importance of Diet and Exercise] : importance of diet and exercise to improve glycemic control, achieve weight loss and improve cardiovascular health [Exercise/Effect on Glucose] : exercise/effect on glucose [Hypoglycemia Management] : hypoglycemia management [Ketone Testing] : ketone testing [Glucagon Use] : glucagon use [Action and use of Insulin] : action and use of short and long-acting insulin [Self Monitoring of Blood Glucose] : self monitoring of blood glucose [Insulin Self-Administration] : insulin self-administration [Sick-Day Management] : sick-day management [Injection Technique, Storage, Sharps Disposal] : injection technique, storage, and sharps disposal [Retinopathy Screening] : Patient was referred to ophthalmology for retinopathy screening

## 2021-09-29 ENCOUNTER — APPOINTMENT (OUTPATIENT)
Dept: OBGYN | Facility: CLINIC | Age: 46
End: 2021-09-29

## 2022-02-09 ENCOUNTER — APPOINTMENT (OUTPATIENT)
Dept: OPHTHALMOLOGY | Facility: CLINIC | Age: 47
End: 2022-02-09
Payer: COMMERCIAL

## 2022-02-09 ENCOUNTER — NON-APPOINTMENT (OUTPATIENT)
Age: 47
End: 2022-02-09

## 2022-02-09 PROCEDURE — 92012 INTRM OPH EXAM EST PATIENT: CPT

## 2022-02-10 ENCOUNTER — APPOINTMENT (OUTPATIENT)
Dept: OPHTHALMOLOGY | Facility: CLINIC | Age: 47
End: 2022-02-10

## 2022-03-01 ENCOUNTER — APPOINTMENT (OUTPATIENT)
Dept: ORTHOPEDIC SURGERY | Facility: CLINIC | Age: 47
End: 2022-03-01
Payer: COMMERCIAL

## 2022-03-01 VITALS — BODY MASS INDEX: 24.84 KG/M2 | HEIGHT: 62 IN | WEIGHT: 135 LBS

## 2022-03-01 DIAGNOSIS — M77.11 LATERAL EPICONDYLITIS, RIGHT ELBOW: ICD-10-CM

## 2022-03-01 DIAGNOSIS — M77.12 LATERAL EPICONDYLITIS, RIGHT ELBOW: ICD-10-CM

## 2022-03-01 PROCEDURE — 73080 X-RAY EXAM OF ELBOW: CPT | Mod: LT

## 2022-03-01 PROCEDURE — 99214 OFFICE O/P EST MOD 30 MIN: CPT

## 2022-03-10 ENCOUNTER — APPOINTMENT (OUTPATIENT)
Dept: OPHTHALMOLOGY | Facility: CLINIC | Age: 47
End: 2022-03-10
Payer: COMMERCIAL

## 2022-03-10 ENCOUNTER — NON-APPOINTMENT (OUTPATIENT)
Age: 47
End: 2022-03-10

## 2022-03-10 PROCEDURE — 92133 CPTRZD OPH DX IMG PST SGM ON: CPT

## 2022-03-10 PROCEDURE — 92012 INTRM OPH EXAM EST PATIENT: CPT

## 2022-03-23 PROBLEM — M77.11 LATERAL EPICONDYLITIS OF BOTH ELBOWS: Status: ACTIVE | Noted: 2022-03-23

## 2022-03-23 NOTE — HISTORY OF PRESENT ILLNESS
[de-identified] : 46 year old female   presents today who bilateral elbow pain x 1 month. No injury reported. The pain is constant worse with lifting, pushing, and pulling. Takes Tylenol as needed. C/O stiffness and night time numbness in bilateral hands. 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

## 2022-03-23 NOTE — PHYSICAL EXAM
[de-identified] : Oriented to time, place, person\par Mood: Normal\par Affect: Normal\par Appearance: Healthy, well appearing, no acute distress.\par Gait: Normal\par Assistive Devices: None\par \par Left elbow exam\par \par Skin: Clean, dry, intact. No ecchymosis. No swelling. No palpable joint effusion.\par ROM: Full extension/flexion, full supination/pronation.\par Painful ROM: Lateral elbow pain with resisted wrist extension\par Tenderness: + medial epicondyle pain. Positive lateral epicondyle pain (ECRB). No olecranon pain. No pain at radial head. No pain to radial tunnel.\par Strength: 5/5 elbow flexion, 5/5 elbow extension, 5/5 supination, 5/5 pronation\par Stability: Stable to vaus/valgus stress\par Vasc: 2+ radial pulse, <2s cap refill\par Sensation: In tact to light touch throughout\par Neuro: Negative Tinel at ulnar canal, AIN/PIN/Ulnar nerve in tact to motor/sensation. \par \par Right elbow exam\par \par Skin: Clean, dry, intact. No ecchymosis. No swelling. No palpable joint effusion.\par ROM: Full extension/flexion, full supination/pronation.\par Painful ROM: Lateral elbow pain with resisted wrist extension\par Tenderness: No medial epicondyle pain. Positive lateral epicondyle pain (ECRB). No olecranon pain. No pain at radial head. No pain to radial tunnel.\par Strength: 5/5 elbow flexion, 5/5 elbow extension, 5/5 supination, 5/5 pronation\par Stability: Stable to vaus/valgus stress\par Vasc: 2+ radial pulse, <2s cap refill\par Sensation: In tact to light touch throughout\par Neuro: + Tinel at ulnar canal, AIN/PIN/Ulnar nerve in tact to motor/sensation.  [de-identified] : The following radiographs were ordered and read by me during this patients visit. I reviewed each radiograph in detail with the patient and discussed the findings as highlighted below. \par \par 3 views of the bilateral elbows were obtained today, 03/01/2022, that show no acute fracture or dislocation. There is no degenerative change seen. There is no gross malalignment. No significant other obvious osseous abnormality, otherwise unremarkable.

## 2022-03-23 NOTE — DISCUSSION/SUMMARY
[de-identified] : 47 y/o female with bilateral lateral epicondylitis. \par \par I discussed with the patient the treatment of lateral epicondylitis bilaterally. I discussed that this is a degenerative condition rather than an inflammatory condition and that the process tends to be reversible (albeit can last from 6-24mos if untreated). The best source of treatment is to reduce the offending repetitive overuse injury process and I counseled the patient on how to do this. First line treatment can include watchful waiting for a period of 6-8 wks with specific activity modification and OTC NSAID's/Tylenol. Further treatment includes the use of counterforce bracing, physical therapy for stretching and strengthening, and the use of injection therapy (steroids/PRP etc). I also discussed the potential role of surgical intervention for chronic symptoms that persist greater than 6-12 months.\par \par At this time as treatment I recommended a period of relative rest, stretching and strengthening modalities as indicated in a patient handout provided as well as counterforce bracing. If no improvement over the next few months, additional treatment such as corticosteroid injection versus formal physical therapy can be performed.\par \par We will see the patient back as needed.

## 2022-03-23 NOTE — ADDENDUM
[FreeTextEntry1] : This note was written by Niki Camp on 03/01/2022 acting solely as a scribe for Dr. Alexandr Bobby.\par \par All medical record entries made by the Scribe were at my, Dr. Alexandr Bobby, direction and personally dictated by me on 03/01/2022. I have personally reviewed the chart and agree that the record accurately reflects my personal performance of the history, physical exam, assessment and plan.

## 2022-04-24 NOTE — ADDENDUM
[FreeTextEntry1] : This note was written by Melinda Dennison on 11/11/2019 acting solely as a scribe for Dr. Alexandr Bobby.\par \par All medical record entries made by the Scribe were at my, Dr. Alexandr Bobby, direction and personally dictated by me on 11/11/2019. I have personally reviewed the chart and agree that the record accurately reflects my personal performance of the history, physical exam, assessment and plan.\par  Patient requests all Lab, Cardiology, and Radiology Results on their Discharge Instructions

## 2022-05-09 ENCOUNTER — RESULT REVIEW (OUTPATIENT)
Age: 47
End: 2022-05-09

## 2022-05-09 ENCOUNTER — APPOINTMENT (OUTPATIENT)
Dept: ULTRASOUND IMAGING | Facility: CLINIC | Age: 47
End: 2022-05-09
Payer: COMMERCIAL

## 2022-05-09 ENCOUNTER — APPOINTMENT (OUTPATIENT)
Dept: MAMMOGRAPHY | Facility: CLINIC | Age: 47
End: 2022-05-09
Payer: COMMERCIAL

## 2022-05-09 PROCEDURE — 77063 BREAST TOMOSYNTHESIS BI: CPT

## 2022-05-09 PROCEDURE — 77067 SCR MAMMO BI INCL CAD: CPT

## 2022-05-09 PROCEDURE — 76641 ULTRASOUND BREAST COMPLETE: CPT | Mod: 50

## 2022-05-18 ENCOUNTER — APPOINTMENT (OUTPATIENT)
Dept: OTOLARYNGOLOGY | Facility: CLINIC | Age: 47
End: 2022-05-18
Payer: OTHER MISCELLANEOUS

## 2022-05-18 VITALS
HEART RATE: 95 BPM | TEMPERATURE: 98.2 F | SYSTOLIC BLOOD PRESSURE: 115 MMHG | WEIGHT: 135 LBS | BODY MASS INDEX: 24.84 KG/M2 | DIASTOLIC BLOOD PRESSURE: 74 MMHG | HEIGHT: 62 IN

## 2022-05-18 PROCEDURE — 31231 NASAL ENDOSCOPY DX: CPT

## 2022-05-18 PROCEDURE — 99204 OFFICE O/P NEW MOD 45 MIN: CPT | Mod: 25

## 2022-05-18 NOTE — ASSESSMENT
[FreeTextEntry1] : Ms. VASQUEZ is a 46 year female with 1 month h/o nasal congestion. On exam found to have small nasal polyp from the superior meatus and bilateral inferior turbinate hypertrophy. \par \par - start Dontae med sinus rinses daily with added Budesonide x 1 month/ instructions given\par - will avoid po steroids due to her h/o DM type 1\par - CT sinus - will call with results, plan to do just before next appt\par - Referral for Allergy eval \par - f/u in 1 month\par

## 2022-05-18 NOTE — END OF VISIT
[FreeTextEntry3] : I personally saw and examined KELSEY VASQUEZ in detail.  I spoke to SANGEETA Peters regarding the assessment and plan of care. I performed the procedures and relevant physical exam.  I have reviewed the above assessment and plan of care and I agree.  I have made changes to the body of the note wherever necessary and appropriate.\par

## 2022-05-18 NOTE — HISTORY OF PRESENT ILLNESS
[de-identified] : Ms. VASQUEZ is a 46 year female c/o nasal congestion r>l x 1 month, more congested at work, tried afrin last few days and it helped. + season allergies but this season has been worse \par pt is a type 1 diabetic non smoker\par no h/o surgery or nasal trauma\par \par

## 2022-05-18 NOTE — PROCEDURE
[FreeTextEntry6] : reason for exam: anterior rhinoscopy insufficient for symptom evaluation \par \par Fiberoptic nasal endoscopy was performed.  R/b/a of procedure was explained to the patient and they agreed to proceed with procedure.   B/l inferior turbinate hypertrophy, moderate with edematous mucosa.  small right sided watery polyp medial to middle turbinate, left with polypoid changes to middle turbinate/kamarn bullosa but no distinct polyp seen Remainder of exam normal, including b/l  superior turbinates, inferior, middle and superior meati and sphenoethmoidal recess.  \par Septum deviated left\par \par scope #: 34\par

## 2022-05-18 NOTE — PHYSICAL EXAM
[Nasal Endoscopy Performed] : nasal endoscopy was performed, see procedure section for findings [] : septum deviated to the left [Normal] : mucosa is normal [Midline] : trachea located in midline position [de-identified] : b/l hypertrophy

## 2022-05-31 ENCOUNTER — NON-APPOINTMENT (OUTPATIENT)
Age: 47
End: 2022-05-31

## 2022-05-31 ENCOUNTER — OUTPATIENT (OUTPATIENT)
Dept: OUTPATIENT SERVICES | Facility: HOSPITAL | Age: 47
LOS: 1 days | End: 2022-05-31
Payer: COMMERCIAL

## 2022-05-31 ENCOUNTER — APPOINTMENT (OUTPATIENT)
Dept: CT IMAGING | Facility: CLINIC | Age: 47
End: 2022-05-31
Payer: OTHER MISCELLANEOUS

## 2022-05-31 DIAGNOSIS — J33.9 NASAL POLYP, UNSPECIFIED: ICD-10-CM

## 2022-05-31 PROCEDURE — 70486 CT MAXILLOFACIAL W/O DYE: CPT | Mod: 26

## 2022-05-31 PROCEDURE — 70486 CT MAXILLOFACIAL W/O DYE: CPT

## 2022-06-01 ENCOUNTER — NON-APPOINTMENT (OUTPATIENT)
Age: 47
End: 2022-06-01

## 2022-06-06 ENCOUNTER — NON-APPOINTMENT (OUTPATIENT)
Age: 47
End: 2022-06-06

## 2022-06-07 ENCOUNTER — NON-APPOINTMENT (OUTPATIENT)
Age: 47
End: 2022-06-07

## 2022-06-14 ENCOUNTER — NON-APPOINTMENT (OUTPATIENT)
Age: 47
End: 2022-06-14

## 2022-06-20 ENCOUNTER — NON-APPOINTMENT (OUTPATIENT)
Age: 47
End: 2022-06-20

## 2022-06-22 ENCOUNTER — NON-APPOINTMENT (OUTPATIENT)
Age: 47
End: 2022-06-22

## 2022-06-22 ENCOUNTER — APPOINTMENT (OUTPATIENT)
Dept: OTOLARYNGOLOGY | Facility: CLINIC | Age: 47
End: 2022-06-22
Payer: OTHER MISCELLANEOUS

## 2022-06-22 VITALS
TEMPERATURE: 97.6 F | BODY MASS INDEX: 24.84 KG/M2 | WEIGHT: 135 LBS | HEIGHT: 62 IN | SYSTOLIC BLOOD PRESSURE: 118 MMHG | DIASTOLIC BLOOD PRESSURE: 77 MMHG | HEART RATE: 83 BPM

## 2022-06-22 PROCEDURE — 31231 NASAL ENDOSCOPY DX: CPT

## 2022-06-22 PROCEDURE — 99214 OFFICE O/P EST MOD 30 MIN: CPT | Mod: 25

## 2022-06-22 NOTE — HISTORY OF PRESENT ILLNESS
[de-identified] : Ms. VASQUEZ is a 46 year female c/o nasal congestion r>l x 1 month\par pt is a type 1 diabetic non smoker\par pts symptoms are much worse at work, tend to resolve at home\par \par  [FreeTextEntry1] : s/p Medrol dose cabrera which she said helped for a few days and then congestion returned. although she still has no taste or smell, pt has done Budesonide x 3 weeks, Augmentin and feels the symptoms are unchanged, pt has also been using Afrin twice daily x 5 days as she gets good relief. she feels clogged and under water

## 2022-06-22 NOTE — ASSESSMENT
[FreeTextEntry1] : Ms. VASQUEZ is a 46 year female with 2 month h/o nasal congestion. On exam found to have bilateral nasal polyps from the superior meatus R>L and bilateral inferior turbinate hypertrophy despite use of Budesonide x 1 month and Medrol dose cabrera.  She is highly symptomatic and her polyps look worse today despite budesonide.  Suspect she may be having exposure to an allergen at work. \par \par - CT sinus reviewed - b/l polyps and ethmoid dz\par - d/w pt she should stop Afrin daily and reserve for short time use\par - will start auth for Xhance nasal spray\par - start Singulair daily \par - Rx Medrol dose cabrera - she will try not to take as she is Type 1 DM\par - Will d/w Dr. Bryan as she may require surgery at this point, will refer to Dr. Bryan for next week \par - Allergy appt with Dr. Brink 8/3/22, will see if they can see her sooner. \par \par

## 2022-06-22 NOTE — PROCEDURE
[FreeTextEntry6] : reason for exam: anterior rhinoscopy insufficient for symptom evaluation \par \par Fiberoptic nasal endoscopy was performed.  R/b/a of procedure was explained to the patient and they agreed to proceed with procedure.  Nasal cavities were treated with afrin and lidocaine prior to nasal endoscopy to make the patient more comfortable.  Significant factors noted: b/l middle meatal polyps right > left Otherwise normal mucosa, normal b/l inferior, middle and superior turbinates, inferior, middle and superior meati and sphenoethmoidal recess.  No nasal polyps.  Septum midline\par \par scope #: 28\par

## 2022-06-22 NOTE — PHYSICAL EXAM
[Nasal Endoscopy Performed] : nasal endoscopy was performed, see procedure section for findings [Normal] : no abnormal secretions [de-identified] : enlarged

## 2022-06-23 ENCOUNTER — NON-APPOINTMENT (OUTPATIENT)
Age: 47
End: 2022-06-23

## 2022-06-23 ENCOUNTER — APPOINTMENT (OUTPATIENT)
Dept: OTOLARYNGOLOGY | Facility: CLINIC | Age: 47
End: 2022-06-23
Payer: OTHER MISCELLANEOUS

## 2022-06-23 VITALS
SYSTOLIC BLOOD PRESSURE: 112 MMHG | WEIGHT: 135 LBS | HEART RATE: 114 BPM | DIASTOLIC BLOOD PRESSURE: 77 MMHG | BODY MASS INDEX: 24.84 KG/M2 | TEMPERATURE: 98 F | HEIGHT: 62 IN

## 2022-06-23 PROCEDURE — 99214 OFFICE O/P EST MOD 30 MIN: CPT | Mod: 25

## 2022-06-23 PROCEDURE — 31231 NASAL ENDOSCOPY DX: CPT

## 2022-06-23 NOTE — ASSESSMENT
[FreeTextEntry1] : Nasal congestion and Polyps, Not tolerating Azelastine:\par - Steroid injection today to b/l Nasal polyps and turbinates. (40mg methylprednisolone total)\par - Start Singulair as Rx'ed by Dr. Stanley\par - Start Zyrtec\par - Start Flonase 2 sprays BIR\par - Can hold off on Azelastine since she feels made symptoms worse\par - Resume sinus wash with Budesonide daily if she can tolerate it\par - Awaiting approval for Xhance to use in place of Flonase\par - Keep F/u with Dr. Brink; suspect allergic fungal\par - Will likely require FESS with Polypectomy

## 2022-06-23 NOTE — HISTORY OF PRESENT ILLNESS
[de-identified] : 48 y/o F with Hx of nasal congestion and Polyps.  Has tried Budesonide in sinus wash for 3 weeks without improvement and d/c'ed it.  Notes uses Afrin often, last used 2 days ago.  Was started on Singulair and Azelastine yesterday, has not yet started the Singulair.  She notes the Azelastine made her more congested.    PO steroids held due to Type 1 DM. \par She feels the congestion is severe and not tolerable.\par Has upcoming appt. with Dr. Brink.

## 2022-06-23 NOTE — PROCEDURE
[FreeTextEntry3] : MethylPrednisolone 40 injection to b/l nasal turbinates and polyps:\par MethylPrednisolone 40 used 1 CC injected\par Pt. tolerated procedure well. \par MethylPrednisolone 40 NDC#[id number] [FreeTextEntry6] : Flexible scope #2 was used. Right nasal passage with hypertrophy of inferior, middle and superior turbinates. Nasal passage patent with clear middle meatus and sphenoethmoid recess, pos polyps. Left nasal passage with hypertrophy of inferior, middle and superior turbinates. Nasal passage was patent with clear middle meatus and sphenoethmoid recess, pos nasal polyps. No mucopurulence appreciated. Nasopharynx clear.\par \par

## 2022-06-24 ENCOUNTER — NON-APPOINTMENT (OUTPATIENT)
Age: 47
End: 2022-06-24

## 2022-06-27 ENCOUNTER — APPOINTMENT (OUTPATIENT)
Dept: PEDIATRIC ALLERGY IMMUNOLOGY | Facility: CLINIC | Age: 47
End: 2022-06-27
Payer: OTHER MISCELLANEOUS

## 2022-06-27 ENCOUNTER — NON-APPOINTMENT (OUTPATIENT)
Age: 47
End: 2022-06-27

## 2022-06-27 VITALS
HEIGHT: 62 IN | BODY MASS INDEX: 25.94 KG/M2 | DIASTOLIC BLOOD PRESSURE: 78 MMHG | TEMPERATURE: 96.98 F | HEART RATE: 102 BPM | SYSTOLIC BLOOD PRESSURE: 114 MMHG | OXYGEN SATURATION: 97 % | WEIGHT: 140.99 LBS

## 2022-06-27 DIAGNOSIS — Z80.1 FAMILY HISTORY OF MALIGNANT NEOPLASM OF TRACHEA, BRONCHUS AND LUNG: ICD-10-CM

## 2022-06-27 PROCEDURE — 99204 OFFICE O/P NEW MOD 45 MIN: CPT | Mod: 25

## 2022-06-27 PROCEDURE — 95012 NITRIC OXIDE EXP GAS DETER: CPT | Mod: 59

## 2022-06-27 PROCEDURE — 95004 PERQ TESTS W/ALRGNC XTRCS: CPT

## 2022-06-27 PROCEDURE — 94010 BREATHING CAPACITY TEST: CPT

## 2022-06-27 PROCEDURE — 36415 COLL VENOUS BLD VENIPUNCTURE: CPT

## 2022-06-27 RX ORDER — AZITHROMYCIN 250 MG/1
250 TABLET, FILM COATED ORAL
Qty: 1 | Refills: 0 | Status: COMPLETED | COMMUNITY
Start: 2019-07-30 | End: 2022-06-27

## 2022-06-27 RX ORDER — AZELASTINE HYDROCHLORIDE 137 UG/1
0.1 SPRAY, METERED NASAL TWICE DAILY
Qty: 1 | Refills: 2 | Status: COMPLETED | COMMUNITY
Start: 2022-06-22 | End: 2022-06-27

## 2022-06-27 RX ORDER — ERYTHROMYCIN 5 MG/G
5 OINTMENT OPHTHALMIC
Qty: 4 | Refills: 0 | Status: COMPLETED | COMMUNITY
Start: 2022-02-09 | End: 2022-06-27

## 2022-06-27 RX ORDER — METHYLPREDNISOLONE 4 MG/1
4 TABLET ORAL
Qty: 1 | Refills: 0 | Status: COMPLETED | COMMUNITY
Start: 2022-06-22 | End: 2022-06-27

## 2022-06-27 RX ORDER — AMOXICILLIN AND CLAVULANATE POTASSIUM 875; 125 MG/1; MG/1
875-125 TABLET, COATED ORAL
Qty: 14 | Refills: 0 | Status: COMPLETED | COMMUNITY
Start: 2022-05-31 | End: 2022-06-27

## 2022-06-27 RX ORDER — IBUPROFEN 600 MG/1
600 TABLET, FILM COATED ORAL 3 TIMES DAILY
Qty: 60 | Refills: 0 | Status: COMPLETED | COMMUNITY
Start: 2017-12-21 | End: 2022-06-27

## 2022-06-27 RX ORDER — METHYLPREDNISOLONE 4 MG/1
4 TABLET ORAL
Qty: 1 | Refills: 0 | Status: COMPLETED | COMMUNITY
Start: 2022-05-31 | End: 2022-06-27

## 2022-06-29 ENCOUNTER — LABORATORY RESULT (OUTPATIENT)
Age: 47
End: 2022-06-29

## 2022-06-30 ENCOUNTER — NON-APPOINTMENT (OUTPATIENT)
Age: 47
End: 2022-06-30

## 2022-06-30 NOTE — DATA REVIEWED
[FreeTextEntry1] : \par Nasal endoscopy/ Dr Bryan 6/2022:\par  Right nasal passage with hypertrophy of inferior, middle and superior turbinates. Nasal passage patent with clear middle meatus and sphenoethmoid recess, pos polyps. Left nasal passage with hypertrophy of inferior, middle and superior turbinates. Nasal passage was patent with clear middle meatus and sphenoethmoid recess, pos nasal polyps. No mucopurulence appreciated. Nasopharynx clear.

## 2022-06-30 NOTE — REVIEW OF SYSTEMS
[Nl] : Integumentary [Fatigue] : no fatigue [Fever] : no fever [Dry Eyes] : no dryness ~T of the eyes [Puffy Eyelids] : no puffy ~T eyelids [Bloodshot Eyes] : no bloodshot ~T eyes [Vomiting] : no vomiting [Limping] : no limping [Easy Bruising] : no tendency for easy bruising [Swollen Glands] : no lymphadenopathy [Easy Bleeding] : no ~M tendency for easy bleeding [Sleep Disturbances] : ~T no sleep disturbances [Hyperactive] : no hyperactive behavior [Failure To Thrive] : no failure to thrive [Short Stature] : short stature was not noted [FreeTextEntry4] : see HPI  [FreeTextEntry6] : see HPI

## 2022-06-30 NOTE — PHYSICAL EXAM
[Alert] : alert [Well Nourished] : well nourished [No Acute Distress] : no acute distress [Well Developed] : well developed [Sclera Not Icteric] : sclera not icteric [Normal TMs] : both tympanic membranes were normal [Normal Tonsils] : normal tonsils [No Neck Mass] : no neck mass was observed [Normal Rate and Effort] : normal respiratory rhythm and effort [No Crackles] : no crackles [Bilateral Audible Breath Sounds] : bilateral audible breath sounds [Normal Rate] : heart rate was normal  [Normal S1, S2] : normal S1 and S2 [No murmur] : no murmur [Regular Rhythm] : with a regular rhythm [Skin Intact] : skin intact  [No Rash] : no rash [No Skin Lesions] : no skin lesions [No Cyanosis] : no cyanosis [Cranial Nerves Intact] : cranial nerves 2-12 were intact [Normal Mood] : mood was normal [Normal Affect] : affect was normal [Judgment and Insight Age Appropriate] : judgement and insight is age appropriate [Alert, Awake, Oriented as Age-Appropriate] : alert, awake, oriented as age appropriate [Conjunctival Erythema] : no conjunctival erythema [Boggy Nasal Turbinates] : no boggy and/or pale nasal turbinates [Pharyngeal erythema] : no pharyngeal erythema [Posterior Pharyngeal Cobblestoning] : no posterior pharyngeal cobblestoning [Clear Rhinorrhea] : no clear rhinorrhea was seen [Exudate] : no exudate [Wheezing] : no wheezing was heard [Patches] : no patches

## 2022-06-30 NOTE — IMPRESSION
[Spirometry] : Spirometry [Normal Spirometry] : spirometry normal [Allergy Testing Dust Mite] : dust mites [Allergy Testing Cockroach] : cockroach [Allergy Testing Dog] : dog [Allergy Testing Cat] : cat [Allergy Testing Mixed Feathers] : feathers [] : molds [Allergy Testing Trees] : trees [Allergy Testing Weeds] : weeds [Allergy Testing Grasses] : grasses [________] : [unfilled] [FreeTextEntry2] : FENO-37

## 2022-06-30 NOTE — SOCIAL HISTORY
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  [Dust Mite Covers] : does not have dust mite covers [Bedroom] : not in the bedroom [Smokers in Household] : there are no smokers in the home [de-identified] : had dog,  last month.

## 2022-06-30 NOTE — HISTORY OF PRESENT ILLNESS
[de-identified] : 48 y/o F with type one DM,  nasal congestion and Polyps, currently presenting for an initial evaluation. \par ENT- Dr Bryan\par \par Nasal polyps:\par The patient with persistent nasal congestion, was evaluated with ENT and diagnosed with nasal polyps. The patient was placed on budesonide  without any improvement hence budesonide was discontinued. \par The patient is scheduled for  sinus surgery with polypectomy, on August 2nd. \par -No  sense of smell or taste- since June/2022. \par -oral steroid use- two weeks ago, due to nasal polyps. \par -On Flonase two puff BID. Was on budesonide, without improvement of nasal symptoms, hence  discontinued 2 to three week ago.  \par -Last week she developed wheezing and severe nasal congestion after trying Azelastine. ENT administered,  steroid injection in the polyp with mild improvement of nasal congestion.Azelastine was discontinued.\par \par Sinonasal outcome test (SNOT 22) - 53 ; 6/27/22\par \par Asthma:\par Childhood history of asthma which improved at age of 17 years. The patient was doing well, however for the past few months, her asthma is poorly controlled at work and improves when she is at home. She believes, her reparatory symptoms are associated with presence of  mouse in the  work environment.   Last use of albuterol was last night, due to chest tightness. Admits to mild wheezing and shortness of breath. \par \par  Dr Bryan prescribed Montelukast  last week however the patient never started. \par ACT- 18\par \par No known NSAID allergy. Two weeks ago she took Motrin without any issues. \par \par Admits to sneezing and nasal congestion during spring. \par No history of hives\par \par +nasal congestion with wine. 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

## 2022-06-30 NOTE — END OF VISIT
[Time Spent: ___ minutes] : I have spent [unfilled] minutes of time on the encounter. [FreeTextEntry3] : Katychu De La Cruz acted as a scribe. All medical record entries were made under my, Dr Sophia Brink's direction. I have reviewed the chart and agree that the record accurately reflects my personal opinion of history, physical exam, assessment and plan. I have also personally directed, reviewed, and agree with discharge instructions.

## 2022-06-30 NOTE — CONSULT LETTER
[Dear  ___] : Dear  [unfilled], [Consult Letter:] : I had the pleasure of evaluating your patient, [unfilled]. [Please see my note below.] : Please see my note below. [Consult Closing:] : Thank you very much for allowing me to participate in the care of this patient.  If you have any questions, please do not hesitate to contact me. [Sincerely,] : Sincerely, [FreeTextEntry3] : Sophia Brink MD FAADANA, BRODIE\par Adult and Pediatric Allergy, Asthma and Clinical Immunology\par  of Medicine and Pediatrics at\par   [facility]\par Section Head, Adult Allergy and Immunology\par   [facility]\par   Division of Allergy, Asthma and Immunology\par   [address] [place] [address], [place] [address]\par   [place] [address]\par   Phone [phone]  Fax [phone]\par \par

## 2022-07-01 LAB
A ALTERNATA IGE QN: <0.1 KUA/L
A FUMIGATUS IGE QN: <0.1 KUA/L
ALBUMIN SERPL ELPH-MCNC: 4.4 G/DL
ALP BLD-CCNC: 58 U/L
ALT SERPL-CCNC: 18 U/L
ANION GAP SERPL CALC-SCNC: 12 MMOL/L
AST SERPL-CCNC: 18 U/L
BASOPHILS # BLD AUTO: 0.07 K/UL
BASOPHILS NFR BLD AUTO: 1.1 %
BILIRUB SERPL-MCNC: 0.2 MG/DL
BUN SERPL-MCNC: 14 MG/DL
C HERBARUM IGE QN: <0.1 KUA/L
C LUNATA IGE QN: <0.1 KUA/L
CALCIUM SERPL-MCNC: 9.6 MG/DL
CH50 SERPL-MCNC: >100 U/ML
CHLORIDE SERPL-SCNC: 103 MMOL/L
CO2 SERPL-SCNC: 24 MMOL/L
CREAT SERPL-MCNC: 0.85 MG/DL
DEPRECATED A ALTERNATA IGE RAST QL: 0
DEPRECATED A FUMIGATUS IGE RAST QL: 0
DEPRECATED A PULLULANS IGE RAST QL: 0
DEPRECATED C HERBARUM IGE RAST QL: 0
DEPRECATED C LUNATA IGE RAST QL: 0
DEPRECATED F MONILIFORME IGE RAST QL: 0
DEPRECATED KAPPA LC FREE/LAMBDA SER: 1.08 RATIO
DEPRECATED M RACEMOSUS IGE RAST QL: 0
DEPRECATED MOUSE EPITH IGE RAST QL: 3
DEPRECATED P NOTATUM IGE RAST QL: 0
DEPRECATED R NIGRICANS IGE RAST QL: 0
EGFR: 85 ML/MIN/1.73M2
EOSINOPHIL # BLD AUTO: 0.58 K/UL
EOSINOPHIL NFR BLD AUTO: 8.8 %
F MONILIFORME IGE QN: <0.1 KUA/L
GLUCOSE SERPL-MCNC: 151 MG/DL
HCT VFR BLD CALC: 38.6 %
HGB BLD-MCNC: 12.6 G/DL
IGA SER QL IEP: 161 MG/DL
IGG SER QL IEP: 1049 MG/DL
IGM SER QL IEP: 129 MG/DL
IMM GRANULOCYTES NFR BLD AUTO: 0.2 %
KAPPA LC CSF-MCNC: 1.43 MG/DL
KAPPA LC SERPL-MCNC: 1.54 MG/DL
LYMPHOCYTES # BLD AUTO: 2.7 K/UL
LYMPHOCYTES NFR BLD AUTO: 41 %
M RACEMOSUS IGE QN: <0.1 KUA/L
MAN DIFF?: NORMAL
MCHC RBC-ENTMCNC: 30.6 PG
MCHC RBC-ENTMCNC: 32.6 GM/DL
MCV RBC AUTO: 93.7 FL
MOLD (AUREOBASIDIUM M12) CONC: <0.1 KUA/L
MONOCYTES # BLD AUTO: 0.5 K/UL
MONOCYTES NFR BLD AUTO: 7.6 %
MOUSE EPITH IGE QN: 5.13 KUA/L
NEUTROPHILS # BLD AUTO: 2.73 K/UL
NEUTROPHILS NFR BLD AUTO: 41.3 %
P NOTATUM IGE QN: <0.1 KUA/L
PLATELET # BLD AUTO: 295 K/UL
POTASSIUM SERPL-SCNC: 3.8 MMOL/L
PROT SERPL-MCNC: 7.1 G/DL
R NIGRICANS IGE QN: <0.1 KUA/L
RBC # BLD: 4.12 M/UL
RBC # FLD: 12.8 %
SODIUM SERPL-SCNC: 139 MMOL/L
TOTAL IGE SMQN RAST: 129 KU/L
WBC # FLD AUTO: 6.59 K/UL

## 2022-07-05 ENCOUNTER — RX RENEWAL (OUTPATIENT)
Age: 47
End: 2022-07-05

## 2022-07-05 LAB
MANNAN BINDING LECTIN (MBL): 3183 NG/ML
TRYPTASE: 3.6 UG/L

## 2022-07-08 ENCOUNTER — APPOINTMENT (OUTPATIENT)
Dept: PEDIATRIC ALLERGY IMMUNOLOGY | Facility: CLINIC | Age: 47
End: 2022-07-08
Payer: OTHER MISCELLANEOUS

## 2022-07-08 ENCOUNTER — NON-APPOINTMENT (OUTPATIENT)
Age: 47
End: 2022-07-08

## 2022-07-08 VITALS
SYSTOLIC BLOOD PRESSURE: 121 MMHG | OXYGEN SATURATION: 97 % | BODY MASS INDEX: 25.76 KG/M2 | HEIGHT: 61.97 IN | WEIGHT: 139.99 LBS | HEART RATE: 124 BPM | DIASTOLIC BLOOD PRESSURE: 77 MMHG | TEMPERATURE: 97.7 F

## 2022-07-08 PROCEDURE — 99214 OFFICE O/P EST MOD 30 MIN: CPT | Mod: 25

## 2022-07-08 PROCEDURE — 94010 BREATHING CAPACITY TEST: CPT

## 2022-07-08 NOTE — HISTORY OF PRESENT ILLNESS
[de-identified] : 46 y/o F with type one DM, nasal congestion and Polyps, asthma here for ID testing. \par ENT- Dr Bryan\par \par Nasal polyps:\par The patient with persistent nasal congestion, was evaluated with ENT and diagnosed with nasal polyps. The patient was placed on budesonide without any improvement,  discontinued. She started Xhance two days ago. \par The patient is scheduled for sinus surgery with polypectomy, on August 2nd. If polyp and shrinking then she would like to defer the   surgery. \par -Today's visit, she stated her  sense of smell has slightly improved- able to smell strong smells, which she contributes to flonase/xhance. Last visit- stated-  No sense of smell or taste since June/2022. \par \par -oral steroid use- two weeks ago, due to nasal polyps. \par \par  Adverse drug reaction. \par -Last week she developed wheezing and severe nasal congestion after trying Azelastine. ENT administered, steroid injection in the polyp with mild improvement of nasal congestion.Azelastine was discontinued. \par \par Asthma: \par  Since March, the patient has had sneezing, coughing as soon as she enters work. In May she stared wheezing at work. Singulair and oral antihistamine was stated.  On weekends, she is home and her respiratory symptoms subsides.  There is ?mouse infestation at work, last ST she was positive to mouse.\par Last week, she was started on Flovent ( missed two doses) and she started working from home with significant improvement of respiratory symptoms. \par SNOT(22)-17: 7/8/22\par Sinonasal outcome test (SNOT 22) - 53 ; 6/27/22\par \par Past History: \par Asthma:\par Childhood history of asthma which improved at age of 17 years. The patient was doing well, however for the past few months, her asthma is poorly controlled at work and improves when she is at home. She believes, her reparatory symptoms are associated with presence of mouse in the work environment. Last use of albuterol was last night, due to chest tightness. Admits to mild wheezing and shortness of breath. \par \par  Dr Bryan prescribed Montelukast last week however the patient never started. \par ACT- 18\par \par No known NSAID allergy. Two weeks ago she took Motrin without any issues. \par \par Admits to sneezing and nasal congestion during spring. \par No history of hives\par \par +nasal congestion with wine. 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 \par

## 2022-07-08 NOTE — REVIEW OF SYSTEMS
[Nl] : Integumentary [Fatigue] : no fatigue [Fever] : no fever [Eye Redness] : no redness [Puffy Eyelids] : no puffy ~T eyelids [Swollen Eyelids] : no ~T ~L swollen eyelids [Cyanosis] : no cyanosis [Edema] : no edema [Exercise Intolerance] : no persistence of exercise intolerance [Palpitations] : no palpitations [FreeTextEntry4] : see HPI  [FreeTextEntry6] : see HPI

## 2022-07-08 NOTE — REASON FOR VISIT
[Routine Follow-Up] : a routine follow-up visit for [Hay Fever] : hay fever [Asthma] : asthma [FreeTextEntry2] : ASTHMA

## 2022-07-08 NOTE — PHYSICAL EXAM
[Alert] : alert [Well Nourished] : well nourished [No Acute Distress] : no acute distress [Well Developed] : well developed [Sclera Not Icteric] : sclera not icteric [Normal TMs] : both tympanic membranes were normal [Boggy Nasal Turbinates] : boggy and/or pale nasal turbinates [Normal Rate and Effort] : normal respiratory rhythm and effort [No Crackles] : no crackles [Bilateral Audible Breath Sounds] : bilateral audible breath sounds [Normal Rate] : heart rate was normal  [Normal S1, S2] : normal S1 and S2 [No murmur] : no murmur [Regular Rhythm] : with a regular rhythm [Skin Intact] : skin intact  [No Rash] : no rash [No Skin Lesions] : no skin lesions [Normal Mood] : mood was normal [Normal Affect] : affect was normal [Judgment and Insight Age Appropriate] : judgement and insight is age appropriate [Alert, Awake, Oriented as Age-Appropriate] : alert, awake, oriented as age appropriate [Conjunctival Erythema] : no conjunctival erythema [Posterior Pharyngeal Cobblestoning] : no posterior pharyngeal cobblestoning [Clear Rhinorrhea] : no clear rhinorrhea was seen [Exudate] : no exudate [Wheezing] : no wheezing was heard [Patches] : no patches

## 2022-07-13 ENCOUNTER — NON-APPOINTMENT (OUTPATIENT)
Age: 47
End: 2022-07-13

## 2022-07-19 RX ORDER — FEXOFENADINE HYDROCHLORIDE 180 MG/1
180 TABLET ORAL DAILY
Qty: 30 | Refills: 0 | Status: ACTIVE | COMMUNITY
Start: 2022-07-19 | End: 1900-01-01

## 2022-07-22 ENCOUNTER — APPOINTMENT (OUTPATIENT)
Dept: OTOLARYNGOLOGY | Facility: CLINIC | Age: 47
End: 2022-07-22
Payer: OTHER MISCELLANEOUS

## 2022-07-22 VITALS
BODY MASS INDEX: 26.24 KG/M2 | DIASTOLIC BLOOD PRESSURE: 60 MMHG | HEART RATE: 110 BPM | WEIGHT: 139 LBS | SYSTOLIC BLOOD PRESSURE: 120 MMHG | HEIGHT: 61 IN

## 2022-07-22 PROCEDURE — 99214 OFFICE O/P EST MOD 30 MIN: CPT | Mod: 25

## 2022-07-22 PROCEDURE — 31231 NASAL ENDOSCOPY DX: CPT

## 2022-07-22 NOTE — HISTORY OF PRESENT ILLNESS
[de-identified] : Patient has been doing well since the last visit. Her sense of taste and smell have greatly improved. \par She has been doing the Xhance for about 2 weeks, and taking asthma medication recently which has been helping her breathing. \par On occasion she has some pressure in the front two teeth but this is mild. Her nasal breathing has improved. \par She has been seen by Dr Brink, is now working from home so she is no longer exposed to the environmental allergens that were exacerbating her symptoms \par

## 2022-07-22 NOTE — REVIEW OF SYSTEMS
[Seasonal Allergies] : seasonal allergies [Nasal Congestion] : nasal congestion [Sinus Pain] : sinus pain [Sinus Pressure] : sinus pressure [Sense Of Smell Problem] : sense of smell problem [Negative] : Heme/Lymph

## 2022-07-22 NOTE — REASON FOR VISIT
[Subsequent Evaluation] : a subsequent evaluation for [FreeTextEntry2] : sinus check prior to surgery

## 2022-07-22 NOTE — ASSESSMENT
[FreeTextEntry1] : nasal congestion, polyposis and sinusitis; resolved polyposis and symptoms:\par - s/p kenalog steroid injection last visit and starting xhance the past 2.5 weeks\par - nasal endoscopy looks incredible today with nasal cavity polyps resolved (currently scheduled for FESS, polypectomy 8/2/22, will cancel for now)\par - continue Xhance nasal spray, 2 sprays twice a day for 2 more weeks, then change to one spray BID\par - continue to avoid triggers/external allergen exposure (dust, mice, etc) and to treat allergies; continue asthma treatment as per Dr. Brink\par - suspect she has improved rapidly by avoidance of allergen (in her work environment) with the nasal steroid injections/sprays; will hold off on surgery/dupixent for now and f/u in 4-6 weeks\par \par \par \par I personally saw and examined Ms. KELSEY VASQUEZ in detail this visit today. I personally reviewed the HPI, PMH, FH, SH, ROS and medications/allergies. I have spoken to ROB Zimmer ([place]) regarding the history and have personally determined the assessment and plan of care, and documented this myself. I was present and participated in all key portions of the encounter and all procedures noted above. I have made changes in the body of the note where appropriate.\par \par Attesting Faculty: See Attending Signature Below

## 2022-08-02 ENCOUNTER — APPOINTMENT (OUTPATIENT)
Dept: OTOLARYNGOLOGY | Facility: HOSPITAL | Age: 47
End: 2022-08-02

## 2022-08-03 ENCOUNTER — APPOINTMENT (OUTPATIENT)
Dept: PEDIATRIC ALLERGY IMMUNOLOGY | Facility: CLINIC | Age: 47
End: 2022-08-03

## 2022-08-05 ENCOUNTER — NON-APPOINTMENT (OUTPATIENT)
Age: 47
End: 2022-08-05

## 2022-08-10 ENCOUNTER — APPOINTMENT (OUTPATIENT)
Dept: OTOLARYNGOLOGY | Facility: CLINIC | Age: 47
End: 2022-08-10

## 2022-08-12 LAB — COMPLEMENT, ALTERNATE PATHWAY (AH50): NORMAL

## 2022-08-19 ENCOUNTER — NON-APPOINTMENT (OUTPATIENT)
Age: 47
End: 2022-08-19

## 2022-08-19 ENCOUNTER — APPOINTMENT (OUTPATIENT)
Dept: OTOLARYNGOLOGY | Facility: CLINIC | Age: 47
End: 2022-08-19
Payer: OTHER MISCELLANEOUS

## 2022-08-19 VITALS
SYSTOLIC BLOOD PRESSURE: 116 MMHG | HEART RATE: 108 BPM | TEMPERATURE: 98 F | WEIGHT: 138 LBS | BODY MASS INDEX: 25.4 KG/M2 | DIASTOLIC BLOOD PRESSURE: 62 MMHG | HEIGHT: 62 IN

## 2022-08-19 PROCEDURE — 31231 NASAL ENDOSCOPY DX: CPT

## 2022-08-19 PROCEDURE — 99214 OFFICE O/P EST MOD 30 MIN: CPT | Mod: 25

## 2022-08-19 NOTE — ASSESSMENT
[FreeTextEntry1] : nasal congestion, polyposis and sinusitis; resolved polyposis and symptoms:\par - nasal endoscopy looks good with minimal swelling today with nasal cavity polyps resolved \par - continue Xhance nasal spray, 2 sprays twice a day\par - Restart Budesonide to sinus wash for mild edema and decreased smell\par - continue to avoid triggers/external allergen exposure (dust, mice, etc) and to treat allergies; continue asthma treatment as per Dr. Brink\par - Will discuss with Dr Brink allergy shots vs other recommendation. \par - F/U in 3 months\par \par \par \par I personally saw and examined Ms. KELSEY VASQUEZ in detail this visit today. I personally reviewed the HPI, PMH, FH, SH, ROS and medications/allergies. I have spoken to ROB Zimmer ([place]) regarding the history and have personally determined the assessment and plan of care, and documented this myself. I was present and participated in all key portions of the encounter and all procedures noted above. I have made changes in the body of the note where appropriate.\par \par Attesting Faculty: See Attending Signature Below

## 2022-08-19 NOTE — PROCEDURE
[FreeTextEntry6] : Flexible scope #28 was used. Right nasal passage with normal inferior, middle and superior turbinates. Mild edema medial to middle turb superiorly. Nasal passage patent with clear middle meatus and sphenoethmoid recess. Left nasal passage with normal inferior, middle and superior turbinates. Mild edema medial to middle turb superiorly. Nasal passage was patent with clear middle meatus and sphenoethmoid recess. No mucopurulence or polyps appreciated. Nasopharynx clear.

## 2022-08-19 NOTE — HISTORY OF PRESENT ILLNESS
[de-identified] : Continues using Xhance, she noted sense of smell had started to decrease after going to 1 spray BID.  She went back to 2 sprays BID.  Sense of smell is intermittent.  Mild nasal congestion.  \par Notes there are still mice dropping and and old rugs at work, does have mouse and dust allergies. \par is following with Dr. Brink. \par

## 2022-09-09 ENCOUNTER — APPOINTMENT (OUTPATIENT)
Dept: PEDIATRIC ALLERGY IMMUNOLOGY | Facility: CLINIC | Age: 47
End: 2022-09-09
Payer: COMMERCIAL

## 2022-09-09 ENCOUNTER — NON-APPOINTMENT (OUTPATIENT)
Age: 47
End: 2022-09-09

## 2022-09-09 VITALS
DIASTOLIC BLOOD PRESSURE: 75 MMHG | HEART RATE: 93 BPM | OXYGEN SATURATION: 98 % | SYSTOLIC BLOOD PRESSURE: 117 MMHG | TEMPERATURE: 96.9 F

## 2022-09-09 PROCEDURE — XXXXX: CPT | Mod: 1L

## 2022-09-09 NOTE — HISTORY OF PRESENT ILLNESS
[de-identified] : 46 y/o F with type one DM, nasal congestion and history of  Polyps, asthma and allergic rhinitis here for follow up. Her polyps resolved with initiation of xhance ( started two months ago), hence polypectomy was cancelled.\par ENT- Dr Bryan\par \par Intermittent history:\par -With Xhance symptoms or smell and taste improved. \par -No polyp with Xhance. \par Using budesonide prn. 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 Exposure to mice at work, led to  respiratory symptoms, such as wheezing , chest tightness and shortness of breath within months of working at the new office. Her respiratory symptoms improved, once she started working remotely and started  Flovent 110 two puff BID and Singulair. However she still admits to chest tightness three times a week. No cough or shortness of breath. ACT-17. \par -Since June 28 2022, she is working remotely with improvement of asthma. Currently on Flovent and montelukast. \par \par Allergic rhinitis: + ST To CR, dog, cat, dust mite, mice and rabbit. Currently well controlled with Allegra and Xhance. \par \par Past history: \par Nasal polyps:\par The patient with persistent nasal congestion, was evaluated with ENT and diagnosed with nasal polyps. The patient was placed on budesonide without any improvement, discontinued. She started Xhance two days ago. \par The patient is scheduled for sinus surgery with polypectomy, on August 2nd. If polyp and shrinking then she would like to defer the surgery. \par -Today's visit, she stated her sense of smell has slightly improved- able to smell strong smells, which she contributes to flonase/xhance. Last visit- stated- No sense of smell or taste since June/2022. \par \par -oral steroid use- two weeks ago, due to nasal polyps. \par \par  Adverse drug reaction. \par -Last week she developed wheezing and severe nasal congestion after trying Azelastine. ENT administered, steroid injection in the polyp with mild improvement of nasal congestion.Azelastine was discontinued. \par \par Asthma: \par  Since March, the patient has had sneezing, coughing as soon as she enters work. In May she stared wheezing at work. Singulair and oral antihistamine was stated. On weekends, she is home and her respiratory symptoms subsides. There is ?mouse infestation at work, last ST she was positive to mouse.\par Last week, she was started on Flovent ( missed two doses) and she started working from home with significant improvement of respiratory symptoms. \par SNOT(22)-17: 7/8/22\par Sinonasal outcome test (SNOT 22) - 53 ; 6/27/22\par \par Past History: \par Asthma:\par Childhood history of asthma which improved at age of 17 years. The patient was doing well, however for the past few months, her asthma is poorly controlled at work and improves when she is at home. She believes, her reparatory symptoms are associated with presence of mouse in the work environment. Last use of albuterol was last night, due to chest tightness. Admits to mild wheezing and shortness of breath. \par \par  Dr Bryan prescribed Montelukast last week however the patient never started. \par ACT- 18\par \par No known NSAID allergy. Two weeks ago she took Motrin without any issues. \par \par Admits to sneezing and nasal congestion during spring. \par No history of hives\par \par +nasal congestion with wine. 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 \par

## 2022-09-09 NOTE — REVIEW OF SYSTEMS
[Nasal Congestion] : nasal congestion [Nl] : Integumentary [Fatigue] : no fatigue [Fever] : no fever [Puffy Eyelids] : no puffy ~T eyelids [Bloodshot Eyes] : no bloodshot ~T eyes [Rhinorrhea] : no rhinorrhea [Post Nasal Drip] : no post nasal drip [Cyanosis] : no cyanosis [Edema] : no edema [Exercise Intolerance] : no persistence of exercise intolerance [Palpitations] : no palpitations [FreeTextEntry6] : see HPI

## 2022-09-12 ENCOUNTER — NON-APPOINTMENT (OUTPATIENT)
Age: 47
End: 2022-09-12

## 2022-09-12 LAB
ALBUMIN SERPL ELPH-MCNC: 4.8 G/DL
ALP BLD-CCNC: 63 U/L
ALT SERPL-CCNC: 20 U/L
ANION GAP SERPL CALC-SCNC: 14 MMOL/L
AST SERPL-CCNC: 19 U/L
BASOPHILS # BLD AUTO: 0.08 K/UL
BASOPHILS NFR BLD AUTO: 1.2 %
BILIRUB SERPL-MCNC: 0.3 MG/DL
BUN SERPL-MCNC: 12 MG/DL
CALCIUM SERPL-MCNC: 10.1 MG/DL
CHLORIDE SERPL-SCNC: 104 MMOL/L
CO2 SERPL-SCNC: 23 MMOL/L
CREAT SERPL-MCNC: 0.86 MG/DL
EGFR: 84 ML/MIN/1.73M2
EOSINOPHIL # BLD AUTO: 0.23 K/UL
EOSINOPHIL NFR BLD AUTO: 3.3 %
GLUCOSE SERPL-MCNC: 155 MG/DL
HCT VFR BLD CALC: 44.1 %
HGB BLD-MCNC: 14.7 G/DL
IMM GRANULOCYTES NFR BLD AUTO: 0.1 %
LYMPHOCYTES # BLD AUTO: 2.6 K/UL
LYMPHOCYTES NFR BLD AUTO: 37.5 %
MAN DIFF?: NORMAL
MCHC RBC-ENTMCNC: 31.6 PG
MCHC RBC-ENTMCNC: 33.3 GM/DL
MCV RBC AUTO: 94.8 FL
MONOCYTES # BLD AUTO: 0.47 K/UL
MONOCYTES NFR BLD AUTO: 6.8 %
NEUTROPHILS # BLD AUTO: 3.55 K/UL
NEUTROPHILS NFR BLD AUTO: 51.1 %
PLATELET # BLD AUTO: 314 K/UL
POTASSIUM SERPL-SCNC: 4.2 MMOL/L
PROT SERPL-MCNC: 7.4 G/DL
RBC # BLD: 4.65 M/UL
RBC # FLD: 12.9 %
SODIUM SERPL-SCNC: 141 MMOL/L
TOTAL IGE SMQN RAST: 77 KU/L
WBC # FLD AUTO: 6.94 K/UL

## 2022-09-19 LAB
CREATININE RANDOM URINE: 36 MG/DL
LEUKOTRIENE E4, URINE: NORMAL PG/MG CR

## 2022-09-26 ENCOUNTER — APPOINTMENT (OUTPATIENT)
Dept: ENDOCRINOLOGY | Facility: CLINIC | Age: 47
End: 2022-09-26
Payer: COMMERCIAL

## 2022-09-26 VITALS
DIASTOLIC BLOOD PRESSURE: 60 MMHG | HEART RATE: 103 BPM | HEIGHT: 62 IN | WEIGHT: 135 LBS | SYSTOLIC BLOOD PRESSURE: 118 MMHG | OXYGEN SATURATION: 97 % | BODY MASS INDEX: 24.84 KG/M2 | TEMPERATURE: 97.2 F

## 2022-09-26 LAB
GLUCOSE BLDC GLUCOMTR-MCNC: 129
HBA1C MFR BLD HPLC: 6.2

## 2022-09-26 PROCEDURE — 99214 OFFICE O/P EST MOD 30 MIN: CPT | Mod: 25

## 2022-09-26 PROCEDURE — 83036 HEMOGLOBIN GLYCOSYLATED A1C: CPT | Mod: QW

## 2022-09-26 PROCEDURE — 82962 GLUCOSE BLOOD TEST: CPT

## 2022-09-26 PROCEDURE — 95251 CONT GLUC MNTR ANALYSIS I&R: CPT

## 2022-09-26 NOTE — HISTORY OF PRESENT ILLNESS
[FreeTextEntry1] : Angy is a 47-year-old female with history type 1 diabetes mellitus, diagnosed at age 27.  She had no prior microvascular complication including diabetic retinopathy, neuropathy nephropathy.  Her EGFR was 75 in Feb 2020.\par \par **Patient last saw Dr. Rodriguez one year ago 9/2021**\par Since last visit patient was diagnosed with lung disease/asthma, related to occupational exposure to mouse droppings. \par  \par Diabetes Disease Course:\par A1c 9/26/22: 6.2%, stable\par \par She had no prior history of coronary artery disease, CHF, CV. She was following up with endocrinologist Dr. Luna at [facility] and now transferred care here when she started working for [employer].  She is currently using tandem insulin pump along with dexcom g6 sensor.  She really likes the [employer] system which helped to prevent hypoglycemia.\par  12/22/2017 8.6% --> 10/09/2018 8.4% --> 03/29/2019 8.6%\par \par Meter Readings/dexcom/pumo:\par Average reading 143, highest 243-- but patient notes that incorrect carb count was entered. \par \par  \par Diet:\par She was doing ketogenic diet from March 2020 to June 2020.  States that she lost about 30 lbs.  But when she went back to eating Carbs, she noticed big spikes in her glucose.  She is reintroducing carbohydrate her diet.  Per download, eating about 30 to 45 g of carbohydrate with each meals.\par \par Current exercise: walks a lot, lift weights at home \par Weight trend: she gained some weight\par She eats the same thing every day. \par \par Optho: glaucoma suspect, due to family history, borderline pressure, and risk for glaucoma with Xhance.\par \par The patient is adherent to diabetic foot precautions:Yes\par \par Last LDL was:  mg/dl She was on Crestor 5 mg daily but has not been taking them consistently.  Last LDL in March 2019 was 144 mg/dl.  \par Last urine micro-albumin was:negative March 2019\par She has  ketone strips, she has glucagon kit \par Regarding hyperlipidemia, on Crestor 5 mg daily. \par \par 9/26/22: nasal polyps, Xhance for nasal polyps intranasal administration. Now is an asthmatic and taking medication, following with immunologist. \par Appetite has decreased.

## 2022-09-26 NOTE — ASSESSMENT
[Diabetes Foot Care] : diabetes foot care [Long Term Vascular Complications] : long term vascular complications of diabetes [Carbohydrate Consistent Diet] : carbohydrate consistent diet [Importance of Diet and Exercise] : importance of diet and exercise to improve glycemic control, achieve weight loss and improve cardiovascular health [Exercise/Effect on Glucose] : exercise/effect on glucose [Hypoglycemia Management] : hypoglycemia management [Glucagon Use] : glucagon use [Ketone Testing] : ketone testing [Action and use of Insulin] : action and use of short and long-acting insulin [Self Monitoring of Blood Glucose] : self monitoring of blood glucose [Insulin Self-Administration] : insulin self-administration [Injection Technique, Storage, Sharps Disposal] : injection technique, storage, and sharps disposal [Sick-Day Management] : sick-day management [Retinopathy Screening] : Patient was referred to ophthalmology for retinopathy screening [FreeTextEntry1] : Patient is a 47-year-old female with history of type 1 diabetes mellitus, here for endocrinology followup, currently utilizing DEXCOM G6 sensor as well as Tandem insulin pump.\par \par 1. Type 1 DM\par A1c 6.2% \par \par Patient is using basal IQ technology 99% of the time.\par Average reading 143, highest 243-- but patient notes that incorrect carb count was entered. \par No lows. \par Her current insulin pump setting is: continue \par Bolus pre-meal. \par Midnight 0.850\par 3 AM 1.00\par 7 AM 1.150\par 11:30 AM 1.150\par 3 PM 0.9 units/h\par 6 PM 1 units/h\par 7 PM 1.1 units/h\par Total 24-hour 25 units\par \par Correction factor 1 unit: 60 mg/dL\par \par Carb ratio\par Midnight 1: 7 g\par 11:30 AM 1: 4.5 g\par \par Target glucose 120 mg/dL\par Active insulin time 4 hours\par Maximum bolus 15 units\par \par UTD with eye doctor and podiatrist. \par Call office if going on steroids, may go on Dupixant, may go on allergy shots. \par \par 2. Hyperlipidemia\par Continue with statin therapy.\par Check lipids. \par Nonfasting had boiled egg and 1/4 bagel and coffee at 630am, right before visit had coffee. \par \par Check lipids and UACR. \par RTC Charissa Brooks for pump help, RTC Dr. Rodriguez 3 months, 6 months.

## 2022-09-29 ENCOUNTER — RX RENEWAL (OUTPATIENT)
Age: 47
End: 2022-09-29

## 2022-10-07 ENCOUNTER — APPOINTMENT (OUTPATIENT)
Dept: OTOLARYNGOLOGY | Facility: CLINIC | Age: 47
End: 2022-10-07
Payer: OTHER MISCELLANEOUS

## 2022-10-07 VITALS
BODY MASS INDEX: 24.66 KG/M2 | HEART RATE: 74 BPM | HEIGHT: 62 IN | SYSTOLIC BLOOD PRESSURE: 104 MMHG | WEIGHT: 134 LBS | TEMPERATURE: 98 F | DIASTOLIC BLOOD PRESSURE: 64 MMHG

## 2022-10-07 PROCEDURE — 31231 NASAL ENDOSCOPY DX: CPT

## 2022-10-07 PROCEDURE — 99213 OFFICE O/P EST LOW 20 MIN: CPT | Mod: 25

## 2022-10-07 NOTE — HISTORY OF PRESENT ILLNESS
[de-identified] : 46 y/o F with Hx of nasal polyps.  Now notes no congestion or sinus pain or pressure. Sense of smell and state are good. No sinus infections since last visit.  Using Xhance, not using sinus wash or Budesonide.  Following with Dr. Brink.

## 2022-10-07 NOTE — PROCEDURE
[FreeTextEntry6] : Flexible scope #26 was used. Right nasal passage with normal inferior, middle and superior turbinates. Nasal passage patent with clear middle meatus and sphenoethmoid recess. Left nasal passage with normal inferior, middle and superior turbinates. Nasal passage was patent with clear middle meatus and sphenoethmoid recess. No mucopurulence or polyps appreciated. Nasopharynx clear.\par \par

## 2022-10-07 NOTE — ASSESSMENT
[FreeTextEntry1] : Nasal polyps and congestion; completely resolved with strict allergen avoidance (mouse):\par -  Doing well on Xhance BID and interested in decreasing meds\par - Can cut down on Xhance from 2 sprays b/l BID to 1 spray b/l BID for 2 weeks.  If doing well can then decrease to one spray b/l once daily. \par - Continues to avoid triggers and external allergy exposures (Dust, mice, etc).\par - Continue to f/u with Dr. Brink for allergies and asthma.  \par - if returns to work in person would like to see her back 2-3 weeks after to reassess nasal endoscopy and see if any signs of recurrent swelling\par \par \par \par I personally saw and examined Ms. KELSEY VASQUEZ in detail this visit today. I personally reviewed the HPI, PMH, FH, SH, ROS and medications/allergies. I have spoken to ROB Flores regarding the history and have personally determined the assessment and plan of care, and documented this myself. I was present and participated in all key portions of the encounter and all procedures noted above. I have made changes in the body of the note where appropriate.\par \par Attesting Faculty: See Attending Signature Below

## 2022-10-27 ENCOUNTER — APPOINTMENT (OUTPATIENT)
Dept: OTOLARYNGOLOGY | Facility: CLINIC | Age: 47
End: 2022-10-27

## 2022-11-04 ENCOUNTER — NON-APPOINTMENT (OUTPATIENT)
Age: 47
End: 2022-11-04

## 2022-11-14 ENCOUNTER — APPOINTMENT (OUTPATIENT)
Dept: ENDOCRINOLOGY | Facility: CLINIC | Age: 47
End: 2022-11-14

## 2022-11-30 ENCOUNTER — NON-APPOINTMENT (OUTPATIENT)
Age: 47
End: 2022-11-30

## 2022-12-05 ENCOUNTER — APPOINTMENT (OUTPATIENT)
Dept: OTOLARYNGOLOGY | Facility: CLINIC | Age: 47
End: 2022-12-05
Payer: OTHER MISCELLANEOUS

## 2022-12-05 VITALS
BODY MASS INDEX: 24.84 KG/M2 | HEART RATE: 74 BPM | DIASTOLIC BLOOD PRESSURE: 69 MMHG | WEIGHT: 135 LBS | HEIGHT: 62 IN | SYSTOLIC BLOOD PRESSURE: 110 MMHG

## 2022-12-05 PROCEDURE — 99213 OFFICE O/P EST LOW 20 MIN: CPT | Mod: 25

## 2022-12-05 PROCEDURE — 31231 NASAL ENDOSCOPY DX: CPT

## 2022-12-05 NOTE — HISTORY OF PRESENT ILLNESS
[de-identified] : 48 y/o F with hx of Nasal polyps.  Was having good results with Xhance and Budesonide in the sinus wash.  Also following with Dr. Brink.  \par Had Come off Allegra and Budesonide wash, but feels she is getting ear discomfort with it.  Also was down to one spray, once a day of Xhance. \par Now notes increased pressure and decreased sense of smell and taste. \par Notes when blowing her nose, will get some d/c from the eye left eye.

## 2022-12-05 NOTE — ASSESSMENT
[FreeTextEntry1] : Hx nasal polyps with recent increase in congestion and decreased taste; no recurrent polyps but some edema noted:\par - Continue Xhance 2 sprays BID\par - Will hold off on Budesonide since it causes ear discomfort\par - F/U one month to recheck\par - Restart Allegra and Singulair per Dr. Brink (will reach out to her to advise patient)\par - F/U with Dr. Brink\par \par Eye d/c with nose blowing, likely retrograde mucus from lacrimal ducts:\par - F/U with Ophthalmology to r/o eye issue (she has already scheduled appt)

## 2022-12-05 NOTE — END OF VISIT
[FreeTextEntry3] : I personally saw and examined Ms. KELSEY VASQUEZ in detail this visit today. I personally reviewed the HPI, PMH, FH, SH, ROS and medications/allergies. I have spoken to ROB Flores regarding the history and have personally determined the assessment and plan of care, and documented this myself. I was present and participated in all key portions of the encounter and all procedures noted above. I have made changes in the body of the note where appropriate.\par \par Attesting Faculty: See Attending Signature Below

## 2022-12-05 NOTE — PROCEDURE
[FreeTextEntry6] : Flexible scope #208 was used. Right nasal passage with hypertrophy of inferior, middle and superior turbinates. Nasal passage patent with clear middle meatus and sphenoethmoid recess. Left nasal passage with normal inferior, middle and superior turbinates. Nasal passage was patent with clear middle meatus and sphenoethmoid recess. No mucopurulence or polyps appreciated. Nasopharynx clear.\par \par

## 2022-12-08 ENCOUNTER — NON-APPOINTMENT (OUTPATIENT)
Age: 47
End: 2022-12-08

## 2022-12-14 ENCOUNTER — NON-APPOINTMENT (OUTPATIENT)
Age: 47
End: 2022-12-14

## 2022-12-14 ENCOUNTER — APPOINTMENT (OUTPATIENT)
Dept: PEDIATRIC ALLERGY IMMUNOLOGY | Facility: CLINIC | Age: 47
End: 2022-12-14
Payer: COMMERCIAL

## 2022-12-14 VITALS
OXYGEN SATURATION: 97 % | SYSTOLIC BLOOD PRESSURE: 106 MMHG | TEMPERATURE: 96.8 F | HEART RATE: 80 BPM | DIASTOLIC BLOOD PRESSURE: 61 MMHG

## 2022-12-14 PROCEDURE — 99214 OFFICE O/P EST MOD 30 MIN: CPT | Mod: 25

## 2022-12-14 RX ORDER — FLUTICASONE PROPIONATE 110 UG/1
110 AEROSOL, METERED RESPIRATORY (INHALATION) TWICE DAILY
Qty: 3 | Refills: 5 | Status: COMPLETED | COMMUNITY
Start: 2022-06-30 | End: 2022-12-14

## 2022-12-14 RX ORDER — FLUTICASONE PROPIONATE 50 UG/1
50 SPRAY, METERED NASAL TWICE DAILY
Qty: 2 | Refills: 3 | Status: COMPLETED | COMMUNITY
Start: 2022-06-23 | End: 2022-12-14

## 2022-12-14 RX ORDER — CETIRIZINE HYDROCHLORIDE 10 MG/1
10 TABLET, FILM COATED ORAL DAILY
Qty: 1 | Refills: 3 | Status: COMPLETED | COMMUNITY
Start: 2022-06-23 | End: 2022-12-14

## 2022-12-14 RX ORDER — CELECOXIB 200 MG/1
200 CAPSULE ORAL
Qty: 50 | Refills: 0 | Status: COMPLETED | COMMUNITY
Start: 2020-05-20 | End: 2022-12-14

## 2022-12-14 RX ORDER — MONTELUKAST 10 MG/1
10 TABLET, FILM COATED ORAL
Qty: 30 | Refills: 0 | Status: COMPLETED | COMMUNITY
Start: 2022-06-22 | End: 2022-12-14

## 2022-12-14 RX ORDER — MELOXICAM 15 MG/1
15 TABLET ORAL
Qty: 30 | Refills: 0 | Status: COMPLETED | COMMUNITY
Start: 2020-07-15 | End: 2022-12-14

## 2022-12-14 RX ORDER — NAPROXEN 500 MG/1
500 TABLET ORAL TWICE DAILY
Qty: 60 | Refills: 0 | Status: COMPLETED | COMMUNITY
Start: 2019-11-11 | End: 2022-12-14

## 2022-12-15 NOTE — REVIEW OF SYSTEMS
[Nl] : Integumentary [Fatigue] : no fatigue [Fever] : no fever [Eye Discharge] : no eye discharge [Eye Redness] : no redness [Puffy Eyelids] : no puffy ~T eyelids [Bloodshot Eyes] : no bloodshot ~T eyes [Cyanosis] : no cyanosis [Edema] : no edema [Exercise Intolerance] : no persistence of exercise intolerance [Palpitations] : no palpitations [FreeTextEntry4] : see HPI  [FreeTextEntry6] : see HPI

## 2022-12-15 NOTE — HISTORY OF PRESENT ILLNESS
[de-identified] : 48 y/o F with hx of Nasal polyps. Was having good results with Xhance and Budesonide in the sinus wash. \par \par The patient was diagnosed with nasal polyps on May/2022, July 2022, Xhance was started. she was  scheduled for polypectomy on Aug 2022. However, her nasal polyps resolved  since she has been working remotely and  initiation of  Xhance . Also admits to  improvement of sense of smell and taste with Xhance two puff BID. October Xahnce was weaned to one puff QD, which led to decrease sense of smell. \par \par -Allergic rhinitis: Not on any oral antihistamine. was offered Immunotherapy the patient deferred it. \par \par Asthma:\par Due to mice infestation, her asthma was poorly controlled, once the patient started working remotely her asthma significantly improved. As asthma symptoms improved ICS was d/c. Uses albuterol once a week, for the past one months (?due to cold weather).

## 2022-12-15 NOTE — END OF VISIT
[FreeTextEntry2] : I personally discussed this patient with the PA at the time of the visit. I agree with assessment and plan as written unless noted below. \par I was present with the PA during the key portions of the history and exam. I agree with the findings and plan as documented in the PA's note, unless noted below.   \par I was present during entire procedure and assisted PA as needed.

## 2023-01-20 ENCOUNTER — APPOINTMENT (OUTPATIENT)
Dept: OTOLARYNGOLOGY | Facility: CLINIC | Age: 48
End: 2023-01-20
Payer: COMMERCIAL

## 2023-01-20 VITALS
HEIGHT: 62 IN | WEIGHT: 135 LBS | SYSTOLIC BLOOD PRESSURE: 105 MMHG | TEMPERATURE: 208.58 F | DIASTOLIC BLOOD PRESSURE: 70 MMHG | BODY MASS INDEX: 24.84 KG/M2 | HEART RATE: 79 BPM

## 2023-01-20 PROCEDURE — 31231 NASAL ENDOSCOPY DX: CPT

## 2023-01-20 PROCEDURE — 99214 OFFICE O/P EST MOD 30 MIN: CPT | Mod: 25

## 2023-01-20 NOTE — ASSESSMENT
[FreeTextEntry1] : Ms. VASQUEZ 47 year F w/ Hx nasal polyps presents for follow up, doing well with Xhance. She restarted Allegra. Continues to have decreased taste at night; no recurrent polyps and edema resolved:\par Plan:\par - Continue Xhance but d/t hx glaucoma will decrease to 1 spray daily and see if that maintains effect\par - Continue Allegra \par - F/U March to recheck \par - continue care with Ophthalmology for f/u ocular pressures\par \par

## 2023-01-20 NOTE — HISTORY OF PRESENT ILLNESS
[de-identified] : 46 y/o F with hx of Nasal polyps.  Was having good results with Xhance and Budesonide in the sinus wash.  Also following with Dr. Brink.  \par Had Come off Allegra and Budesonide wash, but feels she is getting ear discomfort with it.  Also was down to one spray, once a day of Xhance. \par Now notes increased pressure and decreased sense of smell and taste. \par Notes when blowing her nose, will get some d/c from the eye left eye. [FreeTextEntry1] : PAtient here for follow up, using Xhance 2 sprays twice a day, she restarted the Allegra as per her Allergist. At night she notes she has loss of taste and smell. Its not significant but enough to notice it.

## 2023-01-20 NOTE — PROCEDURE
[FreeTextEntry6] : Flexible scope #208 was used. Right nasal passage with normal inferior, middle and superior turbinates. Nasal passage patent with clear middle meatus and sphenoethmoid recess. Left nasal passage with normal inferior, middle and superior turbinates. Nasal passage was patent with clear middle meatus and sphenoethmoid recess. No mucopurulence or polyps appreciated. Nasopharynx clear.\par \par

## 2023-01-27 ENCOUNTER — APPOINTMENT (OUTPATIENT)
Dept: OPHTHALMOLOGY | Facility: CLINIC | Age: 48
End: 2023-01-27
Payer: COMMERCIAL

## 2023-01-27 ENCOUNTER — NON-APPOINTMENT (OUTPATIENT)
Age: 48
End: 2023-01-27

## 2023-01-27 PROCEDURE — 92134 CPTRZ OPH DX IMG PST SGM RTA: CPT

## 2023-01-27 PROCEDURE — 92014 COMPRE OPH EXAM EST PT 1/>: CPT

## 2023-02-01 ENCOUNTER — NON-APPOINTMENT (OUTPATIENT)
Age: 48
End: 2023-02-01

## 2023-02-06 ENCOUNTER — NON-APPOINTMENT (OUTPATIENT)
Age: 48
End: 2023-02-06

## 2023-02-08 ENCOUNTER — NON-APPOINTMENT (OUTPATIENT)
Age: 48
End: 2023-02-08

## 2023-02-17 ENCOUNTER — NON-APPOINTMENT (OUTPATIENT)
Age: 48
End: 2023-02-17

## 2023-03-07 ENCOUNTER — APPOINTMENT (OUTPATIENT)
Dept: ENDOCRINOLOGY | Facility: CLINIC | Age: 48
End: 2023-03-07
Payer: COMMERCIAL

## 2023-03-07 VITALS
DIASTOLIC BLOOD PRESSURE: 70 MMHG | SYSTOLIC BLOOD PRESSURE: 110 MMHG | HEIGHT: 62.5 IN | WEIGHT: 134 LBS | HEART RATE: 78 BPM | BODY MASS INDEX: 24.04 KG/M2 | OXYGEN SATURATION: 96 %

## 2023-03-07 PROCEDURE — 99214 OFFICE O/P EST MOD 30 MIN: CPT

## 2023-03-07 RX ORDER — INSULIN LISPRO 100 [IU]/ML
100 INJECTION, SOLUTION INTRAVENOUS; SUBCUTANEOUS
Qty: 90 | Refills: 1 | Status: DISCONTINUED | COMMUNITY
Start: 2017-10-09 | End: 2023-03-07

## 2023-03-07 NOTE — HISTORY OF PRESENT ILLNESS
[FreeTextEntry1] : Angy is a 47-year-old female with history type 1 diabetes mellitus, diagnosed at age 27.  She had no prior microvascular complication including diabetic retinopathy, neuropathy nephropathy.  \par  \par She had no prior history of coronary artery disease, CHF with CVA as well.\par \par She was following up with endocrinologist Dr. Luna at [facility] and now transferred care here when she started working for [employer].  \par \par She is currently using tandem insulin pump along with dexcom g6 sensor.  She really likes the Smart IQ system which helped to prevent hypoglycemia.  Was given information to upgrade to Control IQ but she hasn't got a chance to set it up yet.  \par \par She was doing ketogenic diet from March 2020 to June 2020.  States that she lost about 30 lbs.  But when she went back to eating Carbs, she noticed big spikes in her glucose.  She is reintroducing carbohydrate her diet.  Per download, eating about 30 to 45 g of carbohydrate with each meals.\par \par Current exercise: walks a lot, lift weights at home \par \par The patient is adherent to diabetic foot precautions:Yes\par Last A1c was: 12/22/2017 8.6% --> 10/09/2018 8.4% --> 03/29/2019 8.6%\par Last LDL was:  mg/dl She was on crestor 5mg daily but has not been taking them consistently.  Last LDL in March 2019 was 144 mg/dl.  \par \par She has  ketone strips, she has glucagon kit \par \par Regarding hyperlipidemia, on Crestor 5mg daily. \par \par Due to environmental factors at work, she developed nasal polyps.  She is being followed by Dr. Bryan from ENT.  She is following up with allergy and immunology with no overall health.  She is now working from home 2 times a week.

## 2023-03-07 NOTE — ASSESSMENT
[FreeTextEntry1] : Patient is a 47-year-old female with history of type 1 diabetes mellitus, here for endocrinology followup, currently utilizing DEXCOM G6 sensor as well as Tandem insulin pump.\par \par 1. Type 1 DM\par She had been home since June 2023; she's doing better with the asthma.  She cannot smell.  She's working about 2 days a week from home.  \par She is up-to-date with ophthalmology and podiatry\par A1c is currently at goal\par Continue with current DM regimen.\par Has glucagon sets\par Has ketone strips.\par CURRENT INSULIN PUMP SETTINGS\par Insulin pump setting\par 12 AM 0.85 units/h\par 3 AM 1.1 units/h\par 7 AM 1.15 units/h\par 11:30 AM 1.15 units/h\par 3 PM 0.9 units/h\par 6 PM 1 units/h\par 7 PM 1.1 units/h\par Total daily dosage: 24.95 units\par \par \par Insulin to carbohydrate ratio\par 12 AM 7 g/unit\par 11:30 AM 4.5 g/unit\par \par Sensitivity \par 12 AM 60 mg/dL\par \par BG target range\par Midnight 120 mg/dL\par 2. Hyperlipidemia\par Continue with statin therapy.\par \par

## 2023-03-07 NOTE — PHYSICAL EXAM
[Alert] : alert [Well Nourished] : well nourished [No Acute Distress] : no acute distress [Well Developed] : well developed [Normal Sclera/Conjunctiva] : normal sclera/conjunctiva [EOMI] : extra ocular movement intact [No Proptosis] : no proptosis [Normal Oropharynx] : the oropharynx was normal [Thyroid Not Enlarged] : the thyroid was not enlarged [No Thyroid Nodules] : no palpable thyroid nodules [No Respiratory Distress] : no respiratory distress [No Accessory Muscle Use] : no accessory muscle use [Clear to Auscultation] : lungs were clear to auscultation bilaterally [Normal S1, S2] : normal S1 and S2 [Normal Rate] : heart rate was normal [Regular Rhythm] : with a regular rhythm [No Edema] : no peripheral edema [Pedal Pulses Normal] : the pedal pulses are present [Normal Bowel Sounds] : normal bowel sounds [Not Tender] : non-tender [Not Distended] : not distended [Soft] : abdomen soft [Normal Anterior Cervical Nodes] : no anterior cervical lymphadenopathy [No Spinal Tenderness] : no spinal tenderness [Spine Straight] : spine straight [No Stigmata of Cushings Syndrome] : no stigmata of Cushings Syndrome [Normal Gait] : normal gait [Normal Strength/Tone] : muscle strength and tone were normal [No Rash] : no rash [Normal Reflexes] : deep tendon reflexes were 2+ and symmetric [Acanthosis Nigricans] : no acanthosis nigricans [No Tremors] : no tremors [Oriented x3] : oriented to person, place, and time

## 2023-03-13 LAB — HBA1C MFR BLD HPLC: 6.7

## 2023-05-05 ENCOUNTER — APPOINTMENT (OUTPATIENT)
Dept: OTOLARYNGOLOGY | Facility: CLINIC | Age: 48
End: 2023-05-05
Payer: COMMERCIAL

## 2023-05-05 VITALS
DIASTOLIC BLOOD PRESSURE: 80 MMHG | HEART RATE: 89 BPM | WEIGHT: 134 LBS | TEMPERATURE: 207.68 F | HEIGHT: 62.5 IN | SYSTOLIC BLOOD PRESSURE: 113 MMHG | BODY MASS INDEX: 24.04 KG/M2

## 2023-05-05 PROCEDURE — 99214 OFFICE O/P EST MOD 30 MIN: CPT | Mod: 25

## 2023-05-05 PROCEDURE — 31231 NASAL ENDOSCOPY DX: CPT

## 2023-05-05 NOTE — HISTORY OF PRESENT ILLNESS
[de-identified] : 46 y/o F with hx of Nasal polyps.  Was having good results with Xhance and Budesonide in the sinus wash.  Also following with Dr. Brink.  \par Is off the budesonide wash. She is on the xhance twice a day when its bad otherwise once a day at night. \par She is following with ophtho for eye pressures and all normal. \par She is working at work 2 days a week and notes for 1-2 days after she returns home she gets stuffy and sense of smell/taste goes away.

## 2023-05-05 NOTE — ASSESSMENT
[FreeTextEntry1] : 47 year F w/ Hx nasal polyps presents for follow up, doing well with Xhance. She restarted Allegra:\par - no recurrent polyps and edema resolved\par - Continue Xhance and advised to increase to twice a day on the days that she is in the office, as that seems to exposure to some of the allergen and then resulted in her having some decreased smell and increased congestion the following day or 2\par - continue follow-up with ophthalmology for ocular pressures\par - Continue Allegra \par -Follow-up with Dr. Brink for asthma and allergies\par - F/U 3 to 4 months to recheck\par \par \par

## 2023-05-11 RX ORDER — FLUTICASONE PROPIONATE 93 UG/1
93 SPRAY, METERED NASAL
Qty: 3 | Refills: 3 | Status: ACTIVE | COMMUNITY
Start: 2022-06-22 | End: 1900-01-01

## 2023-05-15 DIAGNOSIS — N63.0 UNSPECIFIED LUMP IN UNSPECIFIED BREAST: ICD-10-CM

## 2023-05-17 ENCOUNTER — APPOINTMENT (OUTPATIENT)
Dept: ULTRASOUND IMAGING | Facility: CLINIC | Age: 48
End: 2023-05-17
Payer: COMMERCIAL

## 2023-05-17 ENCOUNTER — RESULT REVIEW (OUTPATIENT)
Age: 48
End: 2023-05-17

## 2023-05-17 ENCOUNTER — APPOINTMENT (OUTPATIENT)
Dept: MAMMOGRAPHY | Facility: CLINIC | Age: 48
End: 2023-05-17
Payer: COMMERCIAL

## 2023-05-17 PROCEDURE — 77063 BREAST TOMOSYNTHESIS BI: CPT

## 2023-05-17 PROCEDURE — 77067 SCR MAMMO BI INCL CAD: CPT

## 2023-05-17 PROCEDURE — 76641 ULTRASOUND BREAST COMPLETE: CPT | Mod: 50

## 2023-05-24 ENCOUNTER — NON-APPOINTMENT (OUTPATIENT)
Age: 48
End: 2023-05-24

## 2023-05-24 ENCOUNTER — APPOINTMENT (OUTPATIENT)
Dept: PEDIATRIC ALLERGY IMMUNOLOGY | Facility: CLINIC | Age: 48
End: 2023-05-24
Payer: COMMERCIAL

## 2023-05-24 VITALS
BODY MASS INDEX: 24.22 KG/M2 | HEIGHT: 62.5 IN | TEMPERATURE: 208.4 F | WEIGHT: 135 LBS | OXYGEN SATURATION: 98 % | HEART RATE: 90 BPM | DIASTOLIC BLOOD PRESSURE: 65 MMHG | SYSTOLIC BLOOD PRESSURE: 110 MMHG

## 2023-05-24 DIAGNOSIS — J32.9 CHRONIC SINUSITIS, UNSPECIFIED: ICD-10-CM

## 2023-05-24 PROCEDURE — 99214 OFFICE O/P EST MOD 30 MIN: CPT | Mod: 25

## 2023-05-24 PROCEDURE — 95012 NITRIC OXIDE EXP GAS DETER: CPT | Mod: 59

## 2023-05-24 PROCEDURE — 94010 BREATHING CAPACITY TEST: CPT

## 2023-05-24 RX ORDER — BUDESONIDE 0.5 MG/2ML
0.5 INHALANT ORAL
Qty: 1 | Refills: 2 | Status: ACTIVE | COMMUNITY
Start: 2022-05-18

## 2023-05-24 NOTE — HISTORY OF PRESENT ILLNESS
[de-identified] : 48 y/o F with hx of Nasal polyps. Was having good results with Xhance and Budesonide in the sinus wash. Last month, she returned back to the office twice/ week in person ( past had mouse infestation at work, reportedly office was fumigated) \par \par The patient was diagnosed with nasal polyps on May/2022, July 2022, Xhance was started. she was scheduled for polypectomy on Aug 2022. However, her nasal polyps resolved with initiation of xhance and remote work. Last  month, she went back to work in person, has noticed decreased sense of work and burning in chest, next day.   Dr. Bryan increased Xhance one puff BID on in-person work days and advised to continue Xhance two puff qd other days. Burning in her chest resolves with Symbicort. Admits to using Symbicort 160 two puff qd four times per week. Denies any exertional symptoms, wheezing or shortness of breath. ACT 11 \par \par  Current meds: \par -No budesonide rinse. \par -On fexofenadine. \par -Symbicort 160 two puff qd four times per week\par -Xhance two  puff qd. Two weeks ago, increased Xhance two puff BID, on the days she goes to work other days two puff qd- BY ENT. \par \par -IOP by optho from Jan 2023 wnl. \par \par ENT VISIT- May 5 2023- rhinoscopy NO polyps. \par \par \par -Allergic rhinitis: Not on any oral antihistamine. was offered Immunotherapy the patient deferred it. \par \par Asthma:\par Due to mice infestation, her asthma was poorly controlled, once the patient started working remotely her asthma significantly improved. Few months ago, restarted working twice per week, the day after work she has noticed developing mild chest burning. On Symbicort 160 one puff qd four times a week. ACT 11 \par \par \par  \par

## 2023-05-24 NOTE — END OF VISIT
[FreeTextEntry3] : I, Dr. Sophia Brink, personally performed the evaluation and management (E/M) services for this established patient who presents today with (a) new problem(s)/exacerbation of (an) existing condition(s).  That E/M includes conducting the examination, assessing all new/exacerbated conditions, and establishing a new plan of care.  Today, my HOMERO, Relive, was here to observe my evaluation and management services for this new problem/exacerbated condition to be followed going forward.\par

## 2023-05-24 NOTE — PHYSICAL EXAM
[Alert] : alert [Well Nourished] : well nourished [No Acute Distress] : no acute distress [Well Developed] : well developed [Sclera Not Icteric] : sclera not icteric [Normal TMs] : both tympanic membranes were normal [Normal Rate and Effort] : normal respiratory rhythm and effort [No Crackles] : no crackles [Bilateral Audible Breath Sounds] : bilateral audible breath sounds [Normal Rate] : heart rate was normal  [Normal S1, S2] : normal S1 and S2 [No murmur] : no murmur [Regular Rhythm] : with a regular rhythm [Not Distended] : not distended [Skin Intact] : skin intact  [No Rash] : no rash [No Cyanosis] : no cyanosis [Normal Mood] : mood was normal [Normal Affect] : affect was normal [Judgment and Insight Age Appropriate] : judgement and insight is age appropriate [Alert, Awake, Oriented as Age-Appropriate] : alert, awake, oriented as age appropriate [Conjunctival Erythema] : no conjunctival erythema [Boggy Nasal Turbinates] : no boggy and/or pale nasal turbinates [Pharyngeal erythema] : no pharyngeal erythema [Posterior Pharyngeal Cobblestoning] : no posterior pharyngeal cobblestoning [Clear Rhinorrhea] : no clear rhinorrhea was seen [Exudate] : no exudate [Wheezing] : no wheezing was heard [Patches] : no patches [Urticaria] : no urticaria

## 2023-05-24 NOTE — REVIEW OF SYSTEMS
[Nl] : Integumentary [Fatigue] : no fatigue [Fever] : no fever [Eye Discharge] : no eye discharge [Eye Redness] : no redness [Eye Itching] : no itchy eyes [Puffy Eyelids] : no puffy ~T eyelids [Bloodshot Eyes] : no bloodshot ~T eyes [Redness Of Eyelid] : no redness of ~T eyelid [Nosebleeds] : no epistaxis [Rhinorrhea] : no rhinorrhea [Post Nasal Drip] : no post nasal drip [Sneezing] : no sneezing [Vomiting] : no vomiting [Diarrhea] : no diarrhea [FreeTextEntry6] : see HPI

## 2023-05-25 ENCOUNTER — APPOINTMENT (OUTPATIENT)
Dept: MAMMOGRAPHY | Facility: IMAGING CENTER | Age: 48
End: 2023-05-25
Payer: COMMERCIAL

## 2023-05-25 ENCOUNTER — RESULT REVIEW (OUTPATIENT)
Age: 48
End: 2023-05-25

## 2023-05-25 ENCOUNTER — APPOINTMENT (OUTPATIENT)
Dept: ULTRASOUND IMAGING | Facility: IMAGING CENTER | Age: 48
End: 2023-05-25
Payer: COMMERCIAL

## 2023-05-25 ENCOUNTER — OUTPATIENT (OUTPATIENT)
Dept: OUTPATIENT SERVICES | Facility: HOSPITAL | Age: 48
LOS: 1 days | End: 2023-05-25
Payer: COMMERCIAL

## 2023-05-25 DIAGNOSIS — Z00.8 ENCOUNTER FOR OTHER GENERAL EXAMINATION: ICD-10-CM

## 2023-05-25 PROCEDURE — 77065 DX MAMMO INCL CAD UNI: CPT | Mod: 26,LT

## 2023-05-25 PROCEDURE — G0279: CPT | Mod: 26

## 2023-05-25 PROCEDURE — 76642 ULTRASOUND BREAST LIMITED: CPT | Mod: 26,LT

## 2023-05-25 PROCEDURE — 77065 DX MAMMO INCL CAD UNI: CPT

## 2023-05-25 PROCEDURE — G0279: CPT

## 2023-05-25 PROCEDURE — 76642 ULTRASOUND BREAST LIMITED: CPT

## 2023-06-23 ENCOUNTER — NON-APPOINTMENT (OUTPATIENT)
Age: 48
End: 2023-06-23

## 2023-06-27 ENCOUNTER — NON-APPOINTMENT (OUTPATIENT)
Age: 48
End: 2023-06-27

## 2023-07-19 ENCOUNTER — APPOINTMENT (OUTPATIENT)
Dept: ENDOCRINOLOGY | Facility: CLINIC | Age: 48
End: 2023-07-19
Payer: COMMERCIAL

## 2023-07-19 PROCEDURE — G0108 DIAB MANAGE TRN  PER INDIV: CPT

## 2023-07-31 RX ORDER — INSULIN LISPRO 100 [IU]/ML
100 INJECTION, SOLUTION INTRAVENOUS; SUBCUTANEOUS
Qty: 15 | Refills: 3 | Status: ACTIVE | COMMUNITY
Start: 2021-09-17 | End: 1900-01-01

## 2023-09-07 ENCOUNTER — APPOINTMENT (OUTPATIENT)
Dept: OBGYN | Facility: CLINIC | Age: 48
End: 2023-09-07
Payer: COMMERCIAL

## 2023-09-07 VITALS
HEIGHT: 62.5 IN | BODY MASS INDEX: 25.12 KG/M2 | DIASTOLIC BLOOD PRESSURE: 84 MMHG | WEIGHT: 140 LBS | SYSTOLIC BLOOD PRESSURE: 129 MMHG

## 2023-09-07 DIAGNOSIS — Z01.419 ENCOUNTER FOR GYNECOLOGICAL EXAMINATION (GENERAL) (ROUTINE) W/OUT ABNORMAL FINDINGS: ICD-10-CM

## 2023-09-07 DIAGNOSIS — T83.32XA DISPLACEMENT OF INTRAUTERINE CONTRACEPTIVE DEVICE, INITIAL ENCOUNTER: ICD-10-CM

## 2023-09-07 PROCEDURE — 76857 US EXAM PELVIC LIMITED: CPT

## 2023-09-07 PROCEDURE — 99396 PREV VISIT EST AGE 40-64: CPT

## 2023-09-07 NOTE — PHYSICAL EXAM
[Chaperone Declined] : Patient declined chaperone [Appropriately responsive] : appropriately responsive [Alert] : alert [No Acute Distress] : no acute distress [Soft] : soft [Non-tender] : non-tender [Non-distended] : non-distended [No HSM] : No HSM [No Lesions] : no lesions [No Mass] : no mass [Oriented x3] : oriented x3 [Examination Of The Breasts] : a normal appearance [No Masses] : no breast masses were palpable [Labia Majora] : normal [Labia Minora] : normal [Normal] : normal [Uterine Adnexae] : normal [FreeTextEntry5] : no string seen

## 2023-09-07 NOTE — PLAN
[FreeTextEntry1] : 47yo  here for wellness. Pap today, no string seen but sono confirmed placement. Discussed findings with pt. RTO wellness or prn.

## 2023-09-07 NOTE — HISTORY OF PRESENT ILLNESS
[FreeTextEntry1] : 49yo  here for wellness, last seen 10/2020. T1DM follows with endo. Mirena IUD in place since 2021. Notes last two periods were heavier and more crampy, usually has barely any spotting. Dx with allergic asthma last year, on multiple meds and sees specialist.  HCM - pap 10/2020 nilm/hpv neg - mammo/sono 2023 birads 1  POB: FT CSx2 (- girl 9#, - girl 10#+) Gyn: hx AUB with heavy menses monthly x 7d, denies hx abnl paps, prev used OCPs

## 2023-09-08 LAB — HPV HIGH+LOW RISK DNA PNL CVX: NOT DETECTED

## 2023-09-11 LAB — CYTOLOGY CVX/VAG DOC THIN PREP: NORMAL

## 2023-09-20 ENCOUNTER — NON-APPOINTMENT (OUTPATIENT)
Age: 48
End: 2023-09-20

## 2023-09-20 ENCOUNTER — APPOINTMENT (OUTPATIENT)
Dept: PEDIATRIC ALLERGY IMMUNOLOGY | Facility: CLINIC | Age: 48
End: 2023-09-20
Payer: COMMERCIAL

## 2023-09-20 VITALS
WEIGHT: 141.2 LBS | DIASTOLIC BLOOD PRESSURE: 81 MMHG | OXYGEN SATURATION: 97 % | HEART RATE: 84 BPM | HEIGHT: 62.5 IN | BODY MASS INDEX: 25.34 KG/M2 | SYSTOLIC BLOOD PRESSURE: 131 MMHG

## 2023-09-20 DIAGNOSIS — J45.40 MODERATE PERSISTENT ASTHMA, UNCOMPLICATED: ICD-10-CM

## 2023-09-20 PROCEDURE — 94010 BREATHING CAPACITY TEST: CPT

## 2023-09-20 PROCEDURE — 99214 OFFICE O/P EST MOD 30 MIN: CPT | Mod: 25

## 2023-09-20 RX ORDER — GLUCAGON 1 MG
1 KIT INJECTION
Qty: 1 | Refills: 0 | Status: COMPLETED | COMMUNITY
Start: 2017-12-22 | End: 2023-09-20

## 2023-09-20 RX ORDER — MONTELUKAST 10 MG/1
10 TABLET, FILM COATED ORAL
Qty: 1 | Refills: 1 | Status: ACTIVE | COMMUNITY
Start: 2023-02-08 | End: 1900-01-01

## 2023-09-20 RX ORDER — LEVOCETIRIZINE DIHYDROCHLORIDE 5 MG/1
5 TABLET ORAL DAILY
Qty: 30 | Refills: 5 | Status: COMPLETED | COMMUNITY
Start: 2023-02-01 | End: 2023-09-20

## 2023-09-27 ENCOUNTER — APPOINTMENT (OUTPATIENT)
Dept: OTOLARYNGOLOGY | Facility: CLINIC | Age: 48
End: 2023-09-27
Payer: COMMERCIAL

## 2023-09-27 VITALS
HEART RATE: 85 BPM | TEMPERATURE: 208.4 F | WEIGHT: 141 LBS | DIASTOLIC BLOOD PRESSURE: 69 MMHG | HEIGHT: 62.5 IN | SYSTOLIC BLOOD PRESSURE: 110 MMHG | BODY MASS INDEX: 25.3 KG/M2

## 2023-09-27 DIAGNOSIS — J30.81 ALLERGIC RHINITIS DUE TO ANIMAL (CAT) (DOG) HAIR AND DANDER: ICD-10-CM

## 2023-09-27 DIAGNOSIS — J34.3 HYPERTROPHY OF NASAL TURBINATES: ICD-10-CM

## 2023-09-27 DIAGNOSIS — R09.81 NASAL CONGESTION: ICD-10-CM

## 2023-09-27 DIAGNOSIS — J30.89 OTHER ALLERGIC RHINITIS: ICD-10-CM

## 2023-09-27 DIAGNOSIS — J01.00 ACUTE MAXILLARY SINUSITIS, UNSPECIFIED: ICD-10-CM

## 2023-09-27 DIAGNOSIS — J45.909 UNSPECIFIED ASTHMA, UNCOMPLICATED: ICD-10-CM

## 2023-09-27 PROCEDURE — 31231 NASAL ENDOSCOPY DX: CPT

## 2023-09-27 PROCEDURE — 99213 OFFICE O/P EST LOW 20 MIN: CPT | Mod: 25

## 2023-10-11 RX ORDER — AZITHROMYCIN 250 MG/1
250 TABLET, FILM COATED ORAL
Qty: 6 | Refills: 0 | Status: ACTIVE | COMMUNITY
Start: 2023-10-11 | End: 1900-01-01

## 2023-10-20 RX ORDER — FEXOFENADINE HYDROCHLORIDE 180 MG/1
180 TABLET ORAL DAILY
Qty: 1 | Refills: 3 | Status: ACTIVE | COMMUNITY
Start: 2022-07-20 | End: 1900-01-01

## 2023-11-30 ENCOUNTER — NON-APPOINTMENT (OUTPATIENT)
Age: 48
End: 2023-11-30

## 2023-11-30 ENCOUNTER — APPOINTMENT (OUTPATIENT)
Dept: OPHTHALMOLOGY | Facility: CLINIC | Age: 48
End: 2023-11-30
Payer: COMMERCIAL

## 2023-11-30 PROCEDURE — 92133 CPTRZD OPH DX IMG PST SGM ON: CPT

## 2023-11-30 PROCEDURE — 92083 EXTENDED VISUAL FIELD XM: CPT

## 2023-11-30 PROCEDURE — 92014 COMPRE OPH EXAM EST PT 1/>: CPT

## 2023-12-08 ENCOUNTER — NON-APPOINTMENT (OUTPATIENT)
Age: 48
End: 2023-12-08

## 2023-12-11 ENCOUNTER — NON-APPOINTMENT (OUTPATIENT)
Age: 48
End: 2023-12-11

## 2024-01-10 ENCOUNTER — NON-APPOINTMENT (OUTPATIENT)
Age: 49
End: 2024-01-10

## 2024-01-22 RX ORDER — ALBUTEROL SULFATE 90 UG/1
108 (90 BASE) INHALANT RESPIRATORY (INHALATION)
Qty: 1 | Refills: 2 | Status: ACTIVE | COMMUNITY
Start: 2022-06-27 | End: 1900-01-01

## 2024-01-23 ENCOUNTER — NON-APPOINTMENT (OUTPATIENT)
Age: 49
End: 2024-01-23

## 2024-02-12 ENCOUNTER — APPOINTMENT (OUTPATIENT)
Dept: PEDIATRIC ALLERGY IMMUNOLOGY | Facility: CLINIC | Age: 49
End: 2024-02-12

## 2024-03-01 LAB
ALBUMIN SERPL ELPH-MCNC: 4.2 G/DL
ALP BLD-CCNC: 64 U/L
ALT SERPL-CCNC: 15 U/L
ANION GAP SERPL CALC-SCNC: 11 MMOL/L
AST SERPL-CCNC: 16 U/L
BILIRUB SERPL-MCNC: 0.2 MG/DL
BUN SERPL-MCNC: 13 MG/DL
CALCIUM SERPL-MCNC: 9.4 MG/DL
CHLORIDE SERPL-SCNC: 106 MMOL/L
CHOLEST SERPL-MCNC: 172 MG/DL
CO2 SERPL-SCNC: 24 MMOL/L
CREAT SERPL-MCNC: 0.94 MG/DL
CREAT SPEC-SCNC: 115 MG/DL
EGFR: 75 ML/MIN/1.73M2
ESTIMATED AVERAGE GLUCOSE: 140 MG/DL
GLUCOSE SERPL-MCNC: 112 MG/DL
HBA1C MFR BLD HPLC: 6.5 %
HDLC SERPL-MCNC: 66 MG/DL
LDLC SERPL CALC-MCNC: 84 MG/DL
MICROALBUMIN 24H UR DL<=1MG/L-MCNC: <1.2 MG/DL
MICROALBUMIN/CREAT 24H UR-RTO: NORMAL MG/G
NONHDLC SERPL-MCNC: 106 MG/DL
POTASSIUM SERPL-SCNC: 4.2 MMOL/L
PROT SERPL-MCNC: 6.6 G/DL
SODIUM SERPL-SCNC: 141 MMOL/L
T4 FREE SERPL-MCNC: 1.1 NG/DL
TRIGL SERPL-MCNC: 125 MG/DL
TSH SERPL-ACNC: 1.32 UIU/ML

## 2024-03-05 ENCOUNTER — APPOINTMENT (OUTPATIENT)
Dept: ENDOCRINOLOGY | Facility: CLINIC | Age: 49
End: 2024-03-05
Payer: COMMERCIAL

## 2024-03-05 VITALS
SYSTOLIC BLOOD PRESSURE: 109 MMHG | WEIGHT: 140 LBS | HEART RATE: 91 BPM | DIASTOLIC BLOOD PRESSURE: 70 MMHG | BODY MASS INDEX: 25.2 KG/M2 | OXYGEN SATURATION: 99 %

## 2024-03-05 DIAGNOSIS — E78.5 HYPERLIPIDEMIA, UNSPECIFIED: ICD-10-CM

## 2024-03-05 DIAGNOSIS — E10.9 TYPE 1 DIABETES MELLITUS W/OUT COMPLICATIONS: ICD-10-CM

## 2024-03-05 PROCEDURE — 99214 OFFICE O/P EST MOD 30 MIN: CPT

## 2024-03-05 PROCEDURE — 95251 CONT GLUC MNTR ANALYSIS I&R: CPT

## 2024-03-05 NOTE — HISTORY OF PRESENT ILLNESS
[Continuous Glucose Monitoring] : Continuous Glucose Monitoring: Yes [FreeTextEntry1] : Angy is a 48-year-old female with history type 1 diabetes mellitus, diagnosed at age 27.  She had no prior microvascular complication including diabetic retinopathy, neuropathy nephropathy.     She had no prior history of coronary artery disease, CHF with CVA as well.  She was following up with endocrinologist Dr. Luna at [facility] and now transferred care here when she started working for [employer].    She is currently using tandem insulin pump along with dexcom g6 sensor.  She really likes the Smart IQ system which helped to prevent hypoglycemia.  Was given information to upgrade to Control IQ but she hasn't got a chance to set it up yet.    She was doing ketogenic diet from March 2020 to June 2020.  States that she lost about 30 lbs.  But when she went back to eating Carbs, she noticed big spikes in her glucose.  She is reintroducing carbohydrate her diet.  Per download, eating about 30 to 45 g of carbohydrate with each meals.  Current exercise: walks a lot, lift weights at home   The patient is adherent to diabetic foot precautions:Yes Last A1c was: 12/22/2017 8.6% --> 10/09/2018 8.4% --> 03/29/2019 8.6% Last LDL was:  mg/dl She was on crestor 5mg daily but has not been taking them consistently.  Last LDL in March 2019 was 144 mg/dl.    She has  ketone strips, she has glucagon kit   Regarding hyperlipidemia, on Crestor 5mg daily.   Due to environmental factors at work, she developed nasal polyps.  She is being followed by Dr. Bryan from ENT.  She is following up with allergy and immunology with no overall health.  She is now working from home 2 times a week. [FreeTextEntry3] : 20 [FreeTextEntry2] : 79 [FreeTextEntry4] : 1 [de-identified] : 6.8 [FreeTextEntry5] : 147 [FreeTextEntry6] : 31.9% [FreeTextEntry7] : 47

## 2024-03-05 NOTE — ASSESSMENT
[FreeTextEntry1] : Patient is a 47-year-old female with history of type 1 diabetes mellitus, here for endocrinology followup, currently utilizing DEXCOM G6 sensor as well as Tandem insulin pump.  1. Type 1 DM She had been home since June 2023; she's doing better with the asthma. She cannot smell. She's working about 2 days a week from home. She is up-to-date with ophthalmology and podiatry A1c is currently at goal Continue with current DM regimen. Has glucagon sets Has ketone strips. Continue with settings as below:    Diabetes Therapy Regimen   Pump Model: TSLIM X2   Basal Rate Start Time: 0000 Basal: 0.85 units/hour Start Time: 0300 Basal: 1.00 units/hour Start Time: 0700 Basal: 1.15 units/hour Start Time: 1500 Basal: 0.9 units/hour Start Time: 1800 Basal: 1 units/hour Start Time: 1900 Basal: 1.1 units/hour  24.95 units/hour     Carb Ratios Start Time: 000 Carb Ratios:  7 grams/unit Start Time: 0300 Carb Ratios: 7 grams/unit Start Time: 0700 Carb Ratios: 7 grams/unit Start Time: 1130 Carb Ratios: 4.5 grams/unit Start Time: 1500 Carb Ratios: 4.5 grams/unit Start Time: 1800 Carb Ratios: 4.5 grams/unit Start Time: 1900 Carb Ratios: 4.5 grams/unit     Sensitvity Start Time: 000 Sensitivity:  60 mg/dL/U           BG Target Ranges Start Time: 0000 BG Target Ranges:  120 mg/dL            2. Hyperlipidemia LDL goal <70mg/dl, close to goal currently  Continue with statin therapy.  3. Blood pressure ACR annually within normal limits.

## 2024-03-24 RX ORDER — ROSUVASTATIN CALCIUM 5 MG/1
5 TABLET, FILM COATED ORAL
Qty: 90 | Refills: 3 | Status: ACTIVE | COMMUNITY
Start: 2022-09-29 | End: 1900-01-01

## 2024-04-19 RX ORDER — BUDESONIDE AND FORMOTEROL FUMARATE DIHYDRATE 160; 4.5 UG/1; UG/1
160-4.5 AEROSOL RESPIRATORY (INHALATION) TWICE DAILY
Qty: 1 | Refills: 3 | Status: ACTIVE | COMMUNITY
Start: 2022-08-19 | End: 1900-01-01

## 2024-05-15 ENCOUNTER — APPOINTMENT (OUTPATIENT)
Dept: PEDIATRIC ALLERGY IMMUNOLOGY | Facility: CLINIC | Age: 49
End: 2024-05-15

## 2024-05-15 ENCOUNTER — NON-APPOINTMENT (OUTPATIENT)
Age: 49
End: 2024-05-15

## 2024-05-15 VITALS
SYSTOLIC BLOOD PRESSURE: 131 MMHG | HEIGHT: 62.5 IN | DIASTOLIC BLOOD PRESSURE: 81 MMHG | WEIGHT: 145.31 LBS | OXYGEN SATURATION: 96 % | BODY MASS INDEX: 26.07 KG/M2 | HEART RATE: 97 BPM

## 2024-05-15 DIAGNOSIS — J33.9 NASAL POLYP, UNSPECIFIED: ICD-10-CM

## 2024-05-15 PROCEDURE — 99214 OFFICE O/P EST MOD 30 MIN: CPT | Mod: 25

## 2024-05-15 PROCEDURE — 94010 BREATHING CAPACITY TEST: CPT

## 2024-05-15 NOTE — HISTORY OF PRESENT ILLNESS
[de-identified] : 49 y/o F with hx of Nasal polyps and asthma here for follow up.. Was having good results with Xhance and Budesonide in the sinus wash. currently,  in person at work  twice/ week in person ( past had mouse infestation at work, reportedly office was fumigated)  Interval history: On  Symbicort 160 two puff BID and montelukast, after full day of work at on Tues and WED develops chest tighness. Uses albuterol at work.   . Patient believes her asthma symptoms are triggered due to work environment (past there was mouse infestation). Currently, she is working two days a week (Tuesday and Wednesday) at the same office. On Wednesday, evening she starts wheezing and develops decreased sense of smell. Respiratory symptoms improves on Friday. On the days she is working she increases Xhance, symbicort and uses albuterol at the office.   She is compliant with Symbicort 160 two puff BID, singular, fexofenadine, xhance. 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   -Sept 2023- ENT eval- no polyps.   Past history: The patient was diagnosed with nasal polyps on May/2022, July 2022, Xhance was started. she was scheduled for polypectomy on Aug 2022. However, her nasal polyps resolved with initiation of xhance and remote work. Last month, she went back to work in person, has noticed decreased sense of work and burning in chest, next day. Dr. Bryan increased Xhance one puff BID on in-person work days and advised to continue Xhance two puff qd other days. Burning in her chest resolves with Symbicort. Admits to using Symbicort 160 two puff qd four times per week. Denies any exertional symptoms, wheezing or shortness of breath. ACT 11     Current meds:  -No budesonide rinse.  -On fexofenadine.  -Symbicort 160 two puff qd four times per week  -Xhance two puff qd. Two weeks ago, increased Xhance two puff BID, on the days she goes to work other days two puff qd- BY ENT.    -IOP by optho from Jan 2023 wnl.    ENT VISIT- May 5 2023- rhinoscopy NO polyps.      -Allergic rhinitis: Not on any oral antihistamine. was offered Immunotherapy the patient deferred it.    Asthma:  Due to mice infestation, her asthma was poorly controlled, once the patient started working remotely her asthma significantly improved. Few months ago, restarted working twice per week, the day after work she has noticed developing mild chest burning. On Symbicort 160 one puff qd four times a week. ACT 11.

## 2024-05-24 DIAGNOSIS — R92.30 DENSE BREASTS, UNSPECIFIED: ICD-10-CM

## 2024-05-28 DIAGNOSIS — R20.2 PARESTHESIA OF SKIN: ICD-10-CM

## 2024-05-29 RX ORDER — LIDOCAINE 30 MG/G
3 CREAM TOPICAL
Qty: 1 | Refills: 0 | Status: COMPLETED | COMMUNITY
Start: 2024-05-28 | End: 2024-05-29

## 2024-05-29 RX ORDER — LIDOCAINE 5 G/100G
5 OINTMENT TOPICAL
Qty: 1 | Refills: 0 | Status: ACTIVE | COMMUNITY
Start: 2024-05-29 | End: 1900-01-01

## 2024-05-30 ENCOUNTER — APPOINTMENT (OUTPATIENT)
Dept: MAMMOGRAPHY | Facility: IMAGING CENTER | Age: 49
End: 2024-05-30
Payer: COMMERCIAL

## 2024-05-30 ENCOUNTER — RESULT REVIEW (OUTPATIENT)
Age: 49
End: 2024-05-30

## 2024-05-30 ENCOUNTER — OUTPATIENT (OUTPATIENT)
Dept: OUTPATIENT SERVICES | Facility: HOSPITAL | Age: 49
LOS: 1 days | End: 2024-05-30
Payer: COMMERCIAL

## 2024-05-30 ENCOUNTER — APPOINTMENT (OUTPATIENT)
Dept: ULTRASOUND IMAGING | Facility: IMAGING CENTER | Age: 49
End: 2024-05-30
Payer: COMMERCIAL

## 2024-05-30 DIAGNOSIS — Z00.8 ENCOUNTER FOR OTHER GENERAL EXAMINATION: ICD-10-CM

## 2024-05-30 DIAGNOSIS — Z00.00 ENCOUNTER FOR GENERAL ADULT MEDICAL EXAMINATION WITHOUT ABNORMAL FINDINGS: ICD-10-CM

## 2024-05-30 DIAGNOSIS — R92.30 DENSE BREASTS, UNSPECIFIED: ICD-10-CM

## 2024-05-30 PROCEDURE — 77063 BREAST TOMOSYNTHESIS BI: CPT | Mod: 26

## 2024-05-30 PROCEDURE — 77063 BREAST TOMOSYNTHESIS BI: CPT

## 2024-05-30 PROCEDURE — 76641 ULTRASOUND BREAST COMPLETE: CPT | Mod: 26,50

## 2024-05-30 PROCEDURE — 77067 SCR MAMMO BI INCL CAD: CPT

## 2024-05-30 PROCEDURE — 77067 SCR MAMMO BI INCL CAD: CPT | Mod: 26

## 2024-05-30 PROCEDURE — 76641 ULTRASOUND BREAST COMPLETE: CPT

## 2024-06-05 DIAGNOSIS — G89.18 OTHER ACUTE POSTPROCEDURAL PAIN: ICD-10-CM

## 2024-06-05 RX ORDER — LIDOCAINE AND PRILOCAINE 25; 25 MG/G; MG/G
2.5-2.5 CREAM TOPICAL
Qty: 1 | Refills: 1 | Status: ACTIVE | COMMUNITY
Start: 2024-06-05 | End: 1900-01-01

## 2024-07-24 ENCOUNTER — APPOINTMENT (OUTPATIENT)
Dept: OTOLARYNGOLOGY | Facility: CLINIC | Age: 49
End: 2024-07-24
Payer: COMMERCIAL

## 2024-07-24 VITALS
BODY MASS INDEX: 26.1 KG/M2 | SYSTOLIC BLOOD PRESSURE: 112 MMHG | WEIGHT: 145 LBS | TEMPERATURE: 208.04 F | DIASTOLIC BLOOD PRESSURE: 75 MMHG | HEART RATE: 78 BPM

## 2024-07-24 DIAGNOSIS — J32.9 CHRONIC SINUSITIS, UNSPECIFIED: ICD-10-CM

## 2024-07-24 DIAGNOSIS — R43.2 PARAGEUSIA: ICD-10-CM

## 2024-07-24 DIAGNOSIS — J33.9 NASAL POLYP, UNSPECIFIED: ICD-10-CM

## 2024-07-24 DIAGNOSIS — R43.0 ANOSMIA: ICD-10-CM

## 2024-07-24 PROCEDURE — 99213 OFFICE O/P EST LOW 20 MIN: CPT | Mod: 25

## 2024-07-24 PROCEDURE — 31231 NASAL ENDOSCOPY DX: CPT

## 2024-07-24 NOTE — PHYSICAL EXAM
[Normal] : mucosa is normal [Midline] : trachea located in midline position [Nasal Endoscopy Performed] : nasal endoscopy was performed, see procedure section for findings [Laryngoscopy Performed] : laryngoscopy was performed, see procedure section for findings

## 2024-07-24 NOTE — PROCEDURE
[FreeTextEntry6] : Flexible scope #208 was used. Right nasal passage with normal inferior, middle and superior turbinates. Nasal passage patent with small single polyp noted in middle meatus and clear sphenoethmoid recess. Right nasal polyp noted. Left nasal passage with normal inferior, middle and superior turbinates. Nasal passage was patent with clear middle meatus and sphenoethmoid recess. No mucopurulence appreciated. Nasopharynx clear.   [de-identified] : Reason for laryngoscopy: Oral exam unable to account for symptoms.   Flexible scope #2 used. Passed through nasal passage and nasopharynx/oropharynx/hypopharynx clear. Supraglottis normal. Glottis with fully mobile vocal cords without lesions or masses. Visualized subglottis clear. Postcricoid area without erythema or edema. No pooling of secretions.

## 2024-07-24 NOTE — ASSESSMENT
[FreeTextEntry1] : 49 year F w/ Hx nasal polyps presents for follow up, she was doing well on the Xhance.  She is now having nasal congestion and loss of smell and taste. Her asthma has been getting worse. She is losing her voice more often  - Right middle meatus polyp noted on nasal endoscopy today  -VC mobile without any edema, nodules, masses or lesions seen on laryngoscopy  - Increase the Xhance to 2 sprays BID (she did not tolerate azelastine in past so will not add) - continue follow-up with ophthalmology for ocular pressures -Continue to f/u with Dr. Brink for consideration of Dupixent since she is having polyp regrowth and increase in asthma symptoms  - F/U 1-2 months to recheck if polyp improved on increased xhance

## 2024-07-24 NOTE — REASON FOR VISIT
[Subsequent Evaluation] : a subsequent evaluation for [FreeTextEntry2] : Nasal congestion, polyps and decreased sense of smell

## 2024-07-24 NOTE — END OF VISIT
[FreeTextEntry3] : I personally saw and examined Ms. KELSEY VASQUEZ in detail this visit today. I personally reviewed the HPI, PMH, FH, SH, ROS and medications/allergies. I have spoken to ROB Das regarding the history and have personally determined the assessment and plan of care, and documented this myself. I was present and participated in all key portions of the encounter and all procedures noted above. I have made changes in the body of the note where appropriate.   Attesting Faculty: See Attending Signature Below

## 2024-07-24 NOTE — HISTORY OF PRESENT ILLNESS
[de-identified] : 50 y/o F with hx of Nasal polyps.  Was having good results with Xhance and Budesonide in the sinus wash.  Also following with Dr. Brink.   Is off the budesonide wash. She is on the xhance twice a day when its bad otherwise once a day at night.  She is following with ophtho for eye pressures and all normal.  She is working at work 2 days a week and notes for 1-2 days after she returns home she gets stuffy and sense of smell/taste goes away. [FreeTextEntry1] : She is here for f/u. She is losing her smell and taste again. She is noticing she is losing her voice more often. Her asthma is getting worse. She has been having nasal congestion.

## 2024-08-03 ENCOUNTER — RX RENEWAL (OUTPATIENT)
Age: 49
End: 2024-08-03

## 2024-08-22 ENCOUNTER — RX RENEWAL (OUTPATIENT)
Age: 49
End: 2024-08-22

## 2024-08-23 ENCOUNTER — NON-APPOINTMENT (OUTPATIENT)
Age: 49
End: 2024-08-23

## 2024-08-29 ENCOUNTER — NON-APPOINTMENT (OUTPATIENT)
Age: 49
End: 2024-08-29

## 2024-08-30 ENCOUNTER — APPOINTMENT (OUTPATIENT)
Dept: OTOLARYNGOLOGY | Facility: CLINIC | Age: 49
End: 2024-08-30
Payer: OTHER MISCELLANEOUS

## 2024-08-30 VITALS
DIASTOLIC BLOOD PRESSURE: 87 MMHG | WEIGHT: 147 LBS | BODY MASS INDEX: 26.38 KG/M2 | SYSTOLIC BLOOD PRESSURE: 128 MMHG | HEIGHT: 62.5 IN | HEART RATE: 91 BPM

## 2024-08-30 DIAGNOSIS — R43.0 ANOSMIA: ICD-10-CM

## 2024-08-30 DIAGNOSIS — J34.3 HYPERTROPHY OF NASAL TURBINATES: ICD-10-CM

## 2024-08-30 DIAGNOSIS — J31.0 CHRONIC RHINITIS: ICD-10-CM

## 2024-08-30 DIAGNOSIS — R09.81 NASAL CONGESTION: ICD-10-CM

## 2024-08-30 DIAGNOSIS — J33.9 NASAL POLYP, UNSPECIFIED: ICD-10-CM

## 2024-08-30 DIAGNOSIS — J32.9 CHRONIC SINUSITIS, UNSPECIFIED: ICD-10-CM

## 2024-08-30 DIAGNOSIS — J45.40 MODERATE PERSISTENT ASTHMA, UNCOMPLICATED: ICD-10-CM

## 2024-08-30 PROCEDURE — 31231 NASAL ENDOSCOPY DX: CPT

## 2024-08-30 PROCEDURE — 99244 OFF/OP CNSLTJ NEW/EST MOD 40: CPT | Mod: 25

## 2024-08-30 NOTE — ADDENDUM
[FreeTextEntry1] :  Documented by Alexandr Russell acting as scribe for Dr. Thomson on 08/30/2024. All Medical record entries made by the Scribe were at my, Dr. Thomson, direction and personally dictated by me on 08/30/2024 . I have reviewed the chart and agree that the record accurately reflects my personal performance of the history, physical exam, assessment and plan. I have also personally directed, reviewed, and agreed with the discharge instructions.

## 2024-08-30 NOTE — PHYSICAL EXAM
[Hearing Allred Test (Tuning Fork On Forehead)] : no lateralization of tone [] : septum deviated bilaterally [Midline] : trachea located in midline position [Normal] : no rashes [Hearing Loss Right Only] : normal [Hearing Loss Left Only] : normal [de-identified] :  mildly inflamed turbinates [de-identified] : no polyps anteriorly intranasally [de-identified] : elongated and thin uvula. 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  sensations intact

## 2024-08-30 NOTE — HISTORY OF PRESENT ILLNESS
[de-identified] : Patient with h/o anosmia, nasal polyps, and chronic sinusitis presents today stating she wants another opinion on her ENT medications. She denies using Budesonide because Dr. Bryan started her on Xhance as an alternative. She states that she is using Allegra and Singulair. She states that she also takes albuterol and Symbicort for asthma. She states that she frequently loses her taste and smells, especially while using Xhance. She states that her nasal polyps went away when she started Xhance and came back when she started to ween off of Xhance. She denies using saline nasal spray. She states that sinus rinse makes her feel rinse.

## 2024-08-30 NOTE — HISTORY OF PRESENT ILLNESS
[de-identified] : Patient with h/o anosmia, nasal polyps, and chronic sinusitis presents today stating she wants another opinion on her ENT medications. She denies using Budesonide because Dr. Bryan started her on Xhance as an alternative. She states that she is using Allegra and Singulair. She states that she also takes albuterol and Symbicort for asthma. She states that she frequently loses her taste and smells, especially while using Xhance. She states that her nasal polyps went away when she started Xhance and came back when she started to ween off of Xhance. She denies using saline nasal spray. She states that sinus rinse makes her feel rinse.

## 2024-08-30 NOTE — CONSULT LETTER
[Dear  ___] : Dear  [unfilled], [Courtesy Letter:] : I had the pleasure of seeing your patient, [unfilled], in my office today. [Please see my note below.] : Please see my note below. [Consult Closing:] : Thank you very much for allowing me to participate in the care of this patient.  If you have any questions, please do not hesitate to contact me. [Sincerely,] : Sincerely, [FreeTextEntry3] :  Newton Thomson MD FACS

## 2024-08-30 NOTE — PHYSICAL EXAM
[Hearing Allred Test (Tuning Fork On Forehead)] : no lateralization of tone [] : septum deviated bilaterally [Midline] : trachea located in midline position [Normal] : no rashes [Hearing Loss Right Only] : normal [Hearing Loss Left Only] : normal [de-identified] :  mildly inflamed turbinates [de-identified] : no polyps anteriorly intranasally [de-identified] : elongated and thin uvula. 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  sensations intact

## 2024-08-30 NOTE — PROCEDURE
[Recalcitrant Symptoms] : recalcitrant symptoms  [FreeTextEntry6] :  Procedure: Rigid Nasal Endoscopy: Risks, benefits, and alternatives of rigid endoscopy were explained to the patient. The patient gave oral consent to proceed. The rigid scope was inserted into the right nasal cavity. Endoscopy of the inferior and middle meatus was performed. There was a polyp medial to the right middle turbinate posteriorly. No polyps on the left. Olfactory cleft was clear. Spheno-ethmoid recess clear. Nasopharynx was clear. Turbinates were without mass. The procedure was repeated on the contralateral side with similar findings. -left middle meatus clear -normal middle turbinate bilaterally -polyp medial to the right middle turbinate posteriorly -no polyps or growths on the left

## 2024-08-30 NOTE — ASSESSMENT
[FreeTextEntry1] :  Reviewed and reconciled medications, allergies, PMHx, PSHx, SocHx, FMHx.  Patient with h/o anosmia, nasal polyps, and chronic sinusitis presents today stating she wants another opinion on her ENT medications. She denies using Budesonide because Dr. Bryan started her on Xhance as an alternative. She states that she is using Allegra and Singulair. She states that she also takes albuterol and Symbicort for asthma. She states that she frequently loses her taste and smells, especially while using Xhance. She states that her nasal polyps went away when she started Xhance and came back when she started to ween off of Xhance. She denies using saline nasal spray. She states that sinus rinse makes her feel rinse.  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

## 2024-10-03 ENCOUNTER — APPOINTMENT (OUTPATIENT)
Dept: OPHTHALMOLOGY | Facility: CLINIC | Age: 49
End: 2024-10-03
Payer: COMMERCIAL

## 2024-10-03 ENCOUNTER — NON-APPOINTMENT (OUTPATIENT)
Age: 49
End: 2024-10-03

## 2024-10-03 PROCEDURE — 92012 INTRM OPH EXAM EST PATIENT: CPT

## 2024-10-03 PROCEDURE — 92133 CPTRZD OPH DX IMG PST SGM ON: CPT

## 2024-10-03 PROCEDURE — 92002 INTRM OPH EXAM NEW PATIENT: CPT

## 2024-10-04 ENCOUNTER — APPOINTMENT (OUTPATIENT)
Dept: OTOLARYNGOLOGY | Facility: CLINIC | Age: 49
End: 2024-10-04
Payer: COMMERCIAL

## 2024-10-04 VITALS
SYSTOLIC BLOOD PRESSURE: 117 MMHG | DIASTOLIC BLOOD PRESSURE: 74 MMHG | HEIGHT: 62.5 IN | BODY MASS INDEX: 26.38 KG/M2 | HEART RATE: 85 BPM | WEIGHT: 147 LBS

## 2024-10-04 DIAGNOSIS — J32.9 CHRONIC SINUSITIS, UNSPECIFIED: ICD-10-CM

## 2024-10-04 DIAGNOSIS — R43.2 PARAGEUSIA: ICD-10-CM

## 2024-10-04 DIAGNOSIS — R43.0 ANOSMIA: ICD-10-CM

## 2024-10-04 DIAGNOSIS — J33.9 NASAL POLYP, UNSPECIFIED: ICD-10-CM

## 2024-10-04 DIAGNOSIS — R09.81 NASAL CONGESTION: ICD-10-CM

## 2024-10-04 PROCEDURE — 31231 NASAL ENDOSCOPY DX: CPT

## 2024-10-04 PROCEDURE — 99214 OFFICE O/P EST MOD 30 MIN: CPT | Mod: 25

## 2024-10-04 NOTE — ASSESSMENT
[FreeTextEntry1] : 49 year F w/ Hx nasal polyps presents for follow up, she was doing well on the Xhance.  She is now having nasal congestion and loss of smell and taste. Her asthma has been getting worse: - elevated eye pressured on xhance so will d/c xhance and just use saline rinses - recommend start Dupixent which should address her polyps and asthma - f/u in 2-3 months to assess response to dupixent - continue follow-up with ophthalmology for ocular pressures in 3 months

## 2024-10-04 NOTE — PROCEDURE
[FreeTextEntry6] : Flexible scope #208 was used. Right nasal passage with normal inferior, middle and superior turbinates. Nasal passage patent with small single polyp noted in middle meatus, one small polyp medial to middle turbinate and clear sphenoethmoid recess. Left nasal passage with normal inferior, middle and superior turbinates. Nasal passage was patent with one small polyp at middle meatus and sphenoethmoid recess. No mucopurulence appreciated. Nasopharynx clear.   [de-identified] : Reason for laryngoscopy: Oral exam unable to account for symptoms.   Flexible scope #2 used. Passed through nasal passage and nasopharynx/oropharynx/hypopharynx clear. Supraglottis normal. Glottis with fully mobile vocal cords without lesions or masses. Visualized subglottis clear. Postcricoid area without erythema or edema. No pooling of secretions.

## 2024-10-04 NOTE — HISTORY OF PRESENT ILLNESS
[de-identified] : 48 y/o F with hx of Nasal polyps.  Was having good results with Xhance and Budesonide in the sinus wash.  Also following with Dr. Brink.   Is off the budesonide wash. She is on the xhance twice a day when its bad otherwise once a day at night.  She is following with ophtho for eye pressures and all normal.  She is working at work 2 days a week and notes for 1-2 days after she returns home she gets stuffy and sense of smell/taste goes away. [FreeTextEntry1] : She is here for f/u. She saw ophtho and was told her eye  She is losing her smell and taste again. She is noticing she is losing her voice more often. Her asthma is getting worse. She has been having nasal congestion.

## 2024-10-10 RX ORDER — DUPILUMAB 300 MG/2ML
300 INJECTION, SOLUTION SUBCUTANEOUS
Qty: 1 | Refills: 5 | Status: ACTIVE | COMMUNITY
Start: 2024-10-04 | End: 1900-01-01

## 2024-10-11 ENCOUNTER — NON-APPOINTMENT (OUTPATIENT)
Age: 49
End: 2024-10-11

## 2024-10-15 ENCOUNTER — NON-APPOINTMENT (OUTPATIENT)
Age: 49
End: 2024-10-15

## 2024-10-21 ENCOUNTER — NON-APPOINTMENT (OUTPATIENT)
Age: 49
End: 2024-10-21

## 2024-10-21 ENCOUNTER — APPOINTMENT (OUTPATIENT)
Dept: PEDIATRIC ALLERGY IMMUNOLOGY | Facility: CLINIC | Age: 49
End: 2024-10-21
Payer: COMMERCIAL

## 2024-10-21 VITALS
HEART RATE: 105 BPM | DIASTOLIC BLOOD PRESSURE: 79 MMHG | OXYGEN SATURATION: 97 % | HEIGHT: 62.5 IN | SYSTOLIC BLOOD PRESSURE: 137 MMHG | BODY MASS INDEX: 26.44 KG/M2 | WEIGHT: 147.38 LBS

## 2024-10-21 DIAGNOSIS — J33.9 NASAL POLYP, UNSPECIFIED: ICD-10-CM

## 2024-10-21 DIAGNOSIS — J45.909 UNSPECIFIED ASTHMA, UNCOMPLICATED: ICD-10-CM

## 2024-10-21 DIAGNOSIS — Z01.89 ENCOUNTER FOR OTHER SPECIFIED SPECIAL EXAMINATIONS: ICD-10-CM

## 2024-10-21 DIAGNOSIS — J32.9 CHRONIC SINUSITIS, UNSPECIFIED: ICD-10-CM

## 2024-10-21 DIAGNOSIS — J30.81 ALLERGIC RHINITIS DUE TO ANIMAL (CAT) (DOG) HAIR AND DANDER: ICD-10-CM

## 2024-10-21 DIAGNOSIS — J30.89 OTHER ALLERGIC RHINITIS: ICD-10-CM

## 2024-10-21 DIAGNOSIS — J45.40 MODERATE PERSISTENT ASTHMA, UNCOMPLICATED: ICD-10-CM

## 2024-10-21 PROCEDURE — 94060 EVALUATION OF WHEEZING: CPT

## 2024-10-21 PROCEDURE — 36415 COLL VENOUS BLD VENIPUNCTURE: CPT

## 2024-10-21 PROCEDURE — 95079 INGEST CHALLENGE ADDL 60 MIN: CPT

## 2024-10-21 PROCEDURE — 95076 INGEST CHALLENGE INI 120 MIN: CPT

## 2024-10-21 PROCEDURE — 95012 NITRIC OXIDE EXP GAS DETER: CPT

## 2024-10-21 RX ORDER — DUPILUMAB 300 MG/2ML
300 INJECTION, SOLUTION SUBCUTANEOUS
Qty: 0 | Refills: 0 | Status: COMPLETED | OUTPATIENT
Start: 2024-10-21

## 2024-10-21 RX ADMIN — DUPILUMAB 0 MG/2ML: 300 INJECTION, SOLUTION SUBCUTANEOUS at 00:00

## 2024-10-22 ENCOUNTER — APPOINTMENT (OUTPATIENT)
Dept: PEDIATRIC ALLERGY IMMUNOLOGY | Facility: CLINIC | Age: 49
End: 2024-10-22

## 2024-10-24 PROBLEM — Z01.89 ENCOUNTER FOR DRUG CHALLENGE: Status: ACTIVE | Noted: 2024-10-24

## 2024-10-25 LAB
2,3-DINOR-11B-PROSTAGLANDIN F2A, RANDOM URINE: <1361 PG/MG CR
CREATININE RANDOM URINE: 23 MG/DL
CREATININE RANDOM URINE: 23 MG/DL
LEUKOTRIENE E4, URINE: 165 PG/MG CR
TOTAL IGE SMQN RAST: 64 KU/L
TRYPTASE: 3.6 UG/L

## 2024-11-04 ENCOUNTER — APPOINTMENT (OUTPATIENT)
Dept: OBGYN | Facility: CLINIC | Age: 49
End: 2024-11-04
Payer: COMMERCIAL

## 2024-11-04 ENCOUNTER — APPOINTMENT (OUTPATIENT)
Dept: PEDIATRIC ALLERGY IMMUNOLOGY | Facility: CLINIC | Age: 49
End: 2024-11-04
Payer: COMMERCIAL

## 2024-11-04 VITALS — BODY MASS INDEX: 26.44 KG/M2 | HEIGHT: 62.5 IN | WEIGHT: 147.38 LBS

## 2024-11-04 VITALS — HEART RATE: 92 BPM | OXYGEN SATURATION: 97 %

## 2024-11-04 DIAGNOSIS — J33.9 NASAL POLYP, UNSPECIFIED: ICD-10-CM

## 2024-11-04 DIAGNOSIS — Z01.419 ENCOUNTER FOR GYNECOLOGICAL EXAMINATION (GENERAL) (ROUTINE) W/OUT ABNORMAL FINDINGS: ICD-10-CM

## 2024-11-04 DIAGNOSIS — J45.40 MODERATE PERSISTENT ASTHMA, UNCOMPLICATED: ICD-10-CM

## 2024-11-04 PROCEDURE — 99396 PREV VISIT EST AGE 40-64: CPT

## 2024-11-04 PROCEDURE — 96372 THER/PROPH/DIAG INJ SC/IM: CPT

## 2024-11-04 PROCEDURE — G0444 DEPRESSION SCREEN ANNUAL: CPT | Mod: 59

## 2024-11-04 RX ORDER — DUPILUMAB 300 MG/2ML
300 INJECTION, SOLUTION SUBCUTANEOUS
Qty: 0 | Refills: 0 | Status: COMPLETED | OUTPATIENT
Start: 2024-11-04

## 2024-11-04 RX ORDER — DUPILUMAB 300 MG/2ML
300 INJECTION, SOLUTION SUBCUTANEOUS
Refills: 0 | Status: COMPLETED | OUTPATIENT
Start: 2024-11-04 | End: 1900-01-01

## 2024-11-04 RX ADMIN — DUPILUMAB 0 MG/2ML: 300 INJECTION, SOLUTION SUBCUTANEOUS at 00:00

## 2024-11-12 RX ADMIN — DUPILUMAB 0 MG/2ML: 300 INJECTION, SOLUTION SUBCUTANEOUS at 00:00

## 2024-11-18 ENCOUNTER — APPOINTMENT (OUTPATIENT)
Dept: PEDIATRIC ALLERGY IMMUNOLOGY | Facility: CLINIC | Age: 49
End: 2024-11-18
Payer: COMMERCIAL

## 2024-11-18 VITALS
HEIGHT: 62.5 IN | WEIGHT: 147.38 LBS | BODY MASS INDEX: 26.44 KG/M2 | SYSTOLIC BLOOD PRESSURE: 128 MMHG | HEART RATE: 94 BPM | DIASTOLIC BLOOD PRESSURE: 88 MMHG | OXYGEN SATURATION: 98 %

## 2024-11-18 DIAGNOSIS — J32.9 CHRONIC SINUSITIS, UNSPECIFIED: ICD-10-CM

## 2024-11-18 DIAGNOSIS — J33.9 NASAL POLYP, UNSPECIFIED: ICD-10-CM

## 2024-11-18 PROCEDURE — 99213 OFFICE O/P EST LOW 20 MIN: CPT | Mod: 25

## 2024-12-02 ENCOUNTER — APPOINTMENT (OUTPATIENT)
Dept: PEDIATRIC ALLERGY IMMUNOLOGY | Facility: CLINIC | Age: 49
End: 2024-12-02
Payer: COMMERCIAL

## 2024-12-02 VITALS — DIASTOLIC BLOOD PRESSURE: 78 MMHG | HEART RATE: 89 BPM | OXYGEN SATURATION: 96 % | SYSTOLIC BLOOD PRESSURE: 119 MMHG

## 2024-12-02 VITALS — BODY MASS INDEX: 26.44 KG/M2 | HEIGHT: 62.5 IN | WEIGHT: 147.38 LBS

## 2024-12-02 DIAGNOSIS — J33.9 NASAL POLYP, UNSPECIFIED: ICD-10-CM

## 2024-12-02 DIAGNOSIS — J32.9 CHRONIC SINUSITIS, UNSPECIFIED: ICD-10-CM

## 2024-12-02 PROCEDURE — 96372 THER/PROPH/DIAG INJ SC/IM: CPT

## 2024-12-06 RX ADMIN — DUPILUMAB 0 MG/2ML: 300 INJECTION, SOLUTION SUBCUTANEOUS at 00:00

## 2024-12-09 RX ORDER — DUPILUMAB 300 MG/2ML
300 INJECTION, SOLUTION SUBCUTANEOUS
Qty: 0 | Refills: 0 | Status: COMPLETED | OUTPATIENT
Start: 2024-12-06

## 2025-02-03 ENCOUNTER — APPOINTMENT (OUTPATIENT)
Dept: OTOLARYNGOLOGY | Facility: CLINIC | Age: 50
End: 2025-02-03
Payer: OTHER MISCELLANEOUS

## 2025-02-03 VITALS
WEIGHT: 145 LBS | HEIGHT: 62.5 IN | BODY MASS INDEX: 26.02 KG/M2 | HEART RATE: 98 BPM | SYSTOLIC BLOOD PRESSURE: 121 MMHG | DIASTOLIC BLOOD PRESSURE: 79 MMHG

## 2025-02-03 DIAGNOSIS — J33.9 NASAL POLYP, UNSPECIFIED: ICD-10-CM

## 2025-02-03 DIAGNOSIS — J45.909 UNSPECIFIED ASTHMA, UNCOMPLICATED: ICD-10-CM

## 2025-02-03 DIAGNOSIS — R09.81 NASAL CONGESTION: ICD-10-CM

## 2025-02-03 DIAGNOSIS — J32.9 CHRONIC SINUSITIS, UNSPECIFIED: ICD-10-CM

## 2025-02-03 DIAGNOSIS — E10.9 TYPE 1 DIABETES MELLITUS W/OUT COMPLICATIONS: ICD-10-CM

## 2025-02-03 DIAGNOSIS — J31.0 CHRONIC RHINITIS: ICD-10-CM

## 2025-02-03 PROCEDURE — 31231 NASAL ENDOSCOPY DX: CPT

## 2025-02-03 PROCEDURE — 99213 OFFICE O/P EST LOW 20 MIN: CPT | Mod: 25

## 2025-02-03 RX ORDER — DUPILUMAB 300 MG/2ML
300 INJECTION, SOLUTION SUBCUTANEOUS
Qty: 4 | Refills: 12 | Status: ACTIVE | COMMUNITY
Start: 2025-02-03 | End: 1900-01-01

## 2025-03-18 ENCOUNTER — APPOINTMENT (OUTPATIENT)
Dept: ENDOCRINOLOGY | Facility: CLINIC | Age: 50
End: 2025-03-18
Payer: COMMERCIAL

## 2025-03-18 VITALS
HEART RATE: 88 BPM | DIASTOLIC BLOOD PRESSURE: 78 MMHG | BODY MASS INDEX: 26.55 KG/M2 | HEIGHT: 62.5 IN | OXYGEN SATURATION: 97 % | SYSTOLIC BLOOD PRESSURE: 124 MMHG | WEIGHT: 148 LBS

## 2025-03-18 LAB — HBA1C MFR BLD HPLC: 6.4

## 2025-03-18 PROCEDURE — 99214 OFFICE O/P EST MOD 30 MIN: CPT

## 2025-03-18 PROCEDURE — 95251 CONT GLUC MNTR ANALYSIS I&R: CPT

## 2025-03-18 PROCEDURE — 83036 HEMOGLOBIN GLYCOSYLATED A1C: CPT | Mod: QW

## 2025-03-18 RX ORDER — URINE ACETONE TEST STRIPS
STRIP MISCELLANEOUS
Qty: 1 | Refills: 2 | Status: ACTIVE | COMMUNITY
Start: 2025-03-18 | End: 1900-01-01

## 2025-04-03 ENCOUNTER — APPOINTMENT (OUTPATIENT)
Dept: OPHTHALMOLOGY | Facility: CLINIC | Age: 50
End: 2025-04-03

## 2025-05-22 ENCOUNTER — APPOINTMENT (OUTPATIENT)
Dept: MAMMOGRAPHY | Facility: IMAGING CENTER | Age: 50
End: 2025-05-22

## 2025-05-22 ENCOUNTER — RESULT REVIEW (OUTPATIENT)
Age: 50
End: 2025-05-22

## 2025-05-22 ENCOUNTER — OUTPATIENT (OUTPATIENT)
Dept: OUTPATIENT SERVICES | Facility: HOSPITAL | Age: 50
LOS: 1 days | End: 2025-05-22
Payer: COMMERCIAL

## 2025-05-22 ENCOUNTER — APPOINTMENT (OUTPATIENT)
Dept: ULTRASOUND IMAGING | Facility: IMAGING CENTER | Age: 50
End: 2025-05-22

## 2025-05-22 DIAGNOSIS — Z00.8 ENCOUNTER FOR OTHER GENERAL EXAMINATION: ICD-10-CM

## 2025-05-22 PROCEDURE — 77063 BREAST TOMOSYNTHESIS BI: CPT

## 2025-05-22 PROCEDURE — 77067 SCR MAMMO BI INCL CAD: CPT | Mod: 26

## 2025-05-22 PROCEDURE — 77063 BREAST TOMOSYNTHESIS BI: CPT | Mod: 26

## 2025-05-22 PROCEDURE — 76641 ULTRASOUND BREAST COMPLETE: CPT

## 2025-05-22 PROCEDURE — 76641 ULTRASOUND BREAST COMPLETE: CPT | Mod: 26,50

## 2025-05-22 PROCEDURE — 77067 SCR MAMMO BI INCL CAD: CPT

## 2025-05-30 ENCOUNTER — APPOINTMENT (OUTPATIENT)
Dept: OPHTHALMOLOGY | Facility: CLINIC | Age: 50
End: 2025-05-30
Payer: COMMERCIAL

## 2025-05-30 ENCOUNTER — NON-APPOINTMENT (OUTPATIENT)
Age: 50
End: 2025-05-30

## 2025-05-30 PROCEDURE — 92014 COMPRE OPH EXAM EST PT 1/>: CPT

## 2025-05-30 PROCEDURE — 92133 CPTRZD OPH DX IMG PST SGM ON: CPT

## 2025-08-04 ENCOUNTER — APPOINTMENT (OUTPATIENT)
Dept: OTOLARYNGOLOGY | Facility: CLINIC | Age: 50
End: 2025-08-04

## 2025-08-18 RX ORDER — BLOOD-GLUCOSE METER
W/DEVICE KIT MISCELLANEOUS
Qty: 1 | Refills: 0 | Status: ACTIVE | COMMUNITY
Start: 2025-08-18 | End: 1900-01-01

## 2025-08-18 RX ORDER — BLOOD-GLUCOSE METER
KIT MISCELLANEOUS
Qty: 120 | Refills: 5 | Status: ACTIVE | COMMUNITY
Start: 2025-08-18 | End: 1900-01-01

## 2025-09-18 ENCOUNTER — RX RENEWAL (OUTPATIENT)
Age: 50
End: 2025-09-18